# Patient Record
Sex: MALE | Race: WHITE
[De-identification: names, ages, dates, MRNs, and addresses within clinical notes are randomized per-mention and may not be internally consistent; named-entity substitution may affect disease eponyms.]

---

## 2019-01-07 NOTE — RAD
EXAM DESCRIPTION: Femur,Right (accession [id number]),

Hip,Right 2 Views (accession [id number])



CLINICAL HISTORY: 68 years Male, fell



COMPARISON: None.



TECHNIQUE: 4 views of the right hip. 3 views of the mid and

distal right femur.



IMPRESSION:

Partially visualized total left hip arthroplasty. Total right hip

arthroplasty is present without evidence of hardware complication

such as fracture or perivertebral hardware loosening. Surgical

hardware appears in satisfactory alignment.



No acute displaced fracture. No dislocation.



There is no soft tissue swelling or joint effusion. 



There is mild arthrosis of the pubic joint.



 There is no acute fracture or aggressive appearing osseous

lesion of the right femur.



Electronically signed by:  Sp Power MD  1/7/2019 12:04 PM [facility]

Workstation: [id number]

## 2019-01-07 NOTE — RAD
EXAM DESCRIPTION: Femur,Right (accession [id number]),

Hip,Right 2 Views (accession [id number])



CLINICAL HISTORY: 68 years Male, fell



COMPARISON: None.



TECHNIQUE: 4 views of the right hip. 3 views of the mid and

distal right femur.



IMPRESSION:

Partially visualized total left hip arthroplasty. Total right hip

arthroplasty is present without evidence of hardware complication

such as fracture or perivertebral hardware loosening. Surgical

hardware appears in satisfactory alignment.



No acute displaced fracture. No dislocation.



There is no soft tissue swelling or joint effusion. 



There is mild arthrosis of the pubic joint.



 There is no acute fracture or aggressive appearing osseous

lesion of the right femur.



Electronically signed by:  Sp Power MD  1/7/2019 12:04 PM [facility]

Workstation: [id number]

## 2019-01-07 NOTE — ED.PDOC
History of Present Illness





- General


Chief Complaint: Trauma


Stated Complaint: RIGHT SIDE PAIN ALL THE WAY DOWN


Time Seen by Provider: 01/07/19 11:03


Source: patient


Exam Limitations: no limitations





- History of Present Illness


Initial Comments: 





the patient is a 68-year-old  male presenting to the emergency room 

after having had a dresser fall over on him at the nursing home earlier today.  

He has some right hip pain that is mainly concerned about is he has had a repair

there before.  He normally gets around in wheelchair and he is still able to 

transfer while bearing weight on the right hip.  He has numerous other sore 

places but none that are bothering him as much as this.  This occurred this 

morning.


Timing/Duration: momentarily


Severity: moderate


Improving Factors: immobilization


Worsening Factors: movement


Associated Symptoms: denies symptoms





Review of Systems





- Review of Systems


Review of Systems: 





01/07/19 12:14


numerous long term complaints but review of system is for new complaints


Constitutional: States: no symptoms reported


EENTM: States: no symptoms reported


Respiratory: States: no symptoms reported


Cardiology: States: no symptoms reported


Gastrointestinal/Abdominal: States: no symptoms reported


Genitourinary: States: no symptoms reported


Musculoskeletal: States: see HPI


Skin: States: no symptoms reported


Neurological: States: no symptoms reported


Endocrine: States: no symptoms reported


All other Systems: No Change from Baseline





Past Medical History (General)





- Patient Medical History


Hx Hypertension: Yes


Hx Cancer: No


Hx Hepatitis C: No





- Vaccination History


Hx Tetanus, Diphtheria Vaccination: Yes


Hx Influenza Vaccination: Yes


Hx Pneumococcal Vaccination: Yes


Immunizations Up to Date: Yes





- Social History


Hx Tobacco Use: No


Hx Alcohol Use: No


Hx Substance Use: No


Hx Substance Use Treatment: No


Hx Depression: No





- Activities of Daily Living


Nursing Home/Assisted Living (if applicable):: [facility]





Family Medical History





- Family History


  ** Mother


Family History: Unknown





Physical Exam





- Physical Exam


General Appearance: Alert, Comfortable, No apparent distress


Eye Exam: bilateral normal


Ears, Nose, Throat: hearing grossly normal, normal pharynx


Neck: full range of motion, supple


Respiratory: lungs clear, normal breath sounds, no respiratory distress, no 

accessory muscle use


Cardiovascular/Chest: normal peripheral pulses, no edema, other - regular rate


Peripheral Pulses: radial,right: 2+, radial,left: 2+


Gastrointestinal/Abdominal: non tender, soft


Rectal Exam: other - pelvis is stable


Back Exam: no CVA tenderness, no vertebral tenderness


Extremity: normal range of motion - he does have some discomfort to palpation 

over the right hip but no gross deformity.  Previous operative site is noted., 

no calf tenderness, normal capillary refill, other - active and passive range of

motion appear to be preserved in the right hip when compared to baseline.


Neurologic: CNs II-XII nml as tested, alert, normal mood/affect, oriented x 3, 

other - chronic neurological changes are present related to chronic illness


Skin Exam: normal color


Comments: 





                               Vital Signs - 8 hr











  01/07/19





  11:13


 


Temperature 97.4 F L


 


Pulse Rate [ 78





MONITOR] 


 


Respiratory 20





Rate 


 


Blood Pressure 160/98





[Right Arm] 


 


O2 Sat by Pulse 96





Oximetry 














Progress





- Progress


Progress: 





01/07/19 12:16


the patient 68-year-old  male presenting to emergency room secondary to

right hip pain after having fallen backwards with a dresser on top of him.  X-

ray of the pelvis and right hip shows no evidence of any fracture or 

dislocation.  The patient will be sore for the next few weeks but can use his 

hydrocodone as needed.  Needs to ambulate carefully to prevent any further 

falls.  Keep routine follow-up with primary care doctor.  ER warnings were 

given.





Departure





- Departure


Clinical Impression: 


Fall at nursing home


Qualifiers:


 Encounter type: initial encounter Qualified Code(s): W19.XXXA - Unspecified 

fall, initial encounter; Y92.129 - Unspecified place in nursing home as the 

place of occurrence of the external cause





Strain of left hip


Qualifiers:


 Encounter type: initial encounter Qualified Code(s): S76.012A - Strain of 

muscle, fascia and tendon of left hip, initial encounter





Disposition: Discharge to SNF


Condition: Fair


Departure Forms:  ED Discharge - Pt. Copy, Patient Portal Self Enrollment


Diet: diabetic diet


Activity: increase activity as tolerated


Additional Instructions: 


the patient 68-year-old  male presenting to emergency room secondary to

right hip pain after having fallen backwards with a dresser on top of him.  X-

ray of the pelvis and right hip shows no evidence of any fracture or 

dislocation.  The patient will be sore for the next few weeks but can use his 

hydrocodone as needed.  Needs to ambulate carefully to prevent any further 

falls.  Keep routine follow-up with primary care doctor.  ER warnings were 

given.

## 2019-06-18 NOTE — CT
EXAM DESCRIPTION: Cervical Spine



CLINICAL HISTORY: NECK PAIN, FALL



COMPARISON: Previous CT cervical spine April 4, 2016



TECHNIQUE: Cervical CT is performed with thin-section axial

imaging. MPRs are created and reviewed as well.



FINDINGS:



Axial bone window images reveal intact ring of C1. No abnormal

widening of the atlantodens interval. No fracture of the

vertebral bodies or transverse processes or posterior elements.

Lung apices appear clear. No cervical mass or adenopathy.



Sagittal reformatted images show normal alignment of vertebral

bodies and facets. No jumped facet or facet fracture. Normal

craniocervical alignment. No prevertebral soft tissue swelling.

No a avulsion of the spinous processes.



Spondylosis: Mild facet degenerative changes with mild

disc/osteophyte complexes. No acute appearing disc herniation or

significant spinal stenosis.



Coronal reformatted images show normal atlantooccipital and

atlantoaxial alignment. The base of the dens is intact as is the

body of C2. Intact lateral masses.



IMPRESSION:



Negative for fracture or posttraumatic subluxation.







This exam was performed according to our departmental

dose-optimization program, which includes automated exposure

control, adjustment of the mA and/or kV according to patient size

and/or use of iterative reconstruction technique. Total DLP

equals 430.29 mGycm.



Electronically signed by:  Lucas Rodas MD  6/18/2019 3:50

PM CDT Workstation: [id number]

## 2019-06-18 NOTE — CT
EXAM DESCRIPTION: Head CT without contrast



CLINICAL HISTORY: HEAD INJURY, FALL



COMPARISON: Previous CT had August 20, 2018



TECHNIQUE: Noncontrast head CT was performed with routine

protocol.



FINDINGS:



Normal gray-white matter differentiation. Ventricles and sulci

are prominent consistent with age advanced cerebral volume loss.

Low density white matter consistent with chronic microvascular

ischemic change.



No high density hemorrhage, focal edema or shift of the midline.

No sulcal effacement.



Normal orbital contents. Basilar cisterns appear clear.



Intact calvarium with no fracture or lytic lesion. Normal

aeration of tympanic cavities and mastoid air cells. No fluid

levels in the paranasal sinuses. Skull base appears intact.

Symmetrical internal auditory canals. Coronal and sagittal

reformatted images confirm the findings. No change compared to

the previous study August 20, 2018.



IMPRESSION:



No acute intracranial pathologic process.





This exam was performed according to our departmental

dose-optimization program, which includes automated exposure

control, adjustment of the mA and/or kV according to patient size

and/or use of iterative reconstruction technique. Total DLP

equals 864.76 mGycm.



Electronically signed by:  Lucas Rodas MD  6/18/2019 3:46

PM CDT Workstation: [id number]

## 2019-06-18 NOTE — ED.PDOC
History of Present Illness





- General


Chief Complaint: Trauma


Stated Complaint: s/p fall,right shoulder pain


Time Seen by Provider: 06/18/19 14:01


Source: patient, nursing home records


Exam Limitations: clinical condition





- History of Present Illness


Initial Comments: 





PT PRESENTS TO THE ED AFTER FALLING FROM SEATED POSITION WHILE AT NURSING HOME 

WHERE HE RESIDES.  PT REPORTS HITTING HEAD ON A TILE SURFACE BUT DENIES 

HEADACHE.  PTS MAIN COMPLAINT IS RIGHT SHOULDER PAIN, BUT HE ALSO COMPLAINS OF 

NECK PAIN, MID THORACIC BACK PAIN, AND PAIN IN THE RIGHT CHEST WALL.  PT DENIES 

LOC.  PT HAS A HISTORY OF PARKINSONS DISEASE THEREFORE HPI AND ROS IS LIMITED 

DUE TO PTS CHRONIC CONDITION. 


Occurred: just prior to arrival


Severity: moderate


Pain Location: neck, chest, back, upper extremity


Method of Injury: fall


Improving Factors: immobilization


Worsening Factors: movement


Loss of Consciousness: no loss of consciousness


Associated Symptoms (Fall): chest pain, neck pain


Allergies/Adverse Reactions: 


Allergies





Amoxicillin Allergy (Verified 06/18/19 14:18)


   


Codeine Allergy (Verified 06/18/19 14:18)


   








Review of Systems





- Review of Systems


Constitutional: States: see HPI


EENTM: States: see HPI.  Denies: throat swelling


Respiratory: States: see HPI


Cardiology: States: see HPI


Gastrointestinal/Abdominal: States: see HPI


Genitourinary: States: see HPI


Musculoskeletal: States: see HPI


Skin: States: see HPI


Neurological: States: see HPI


Endocrine: States: see HPI





Past Medical History (General)





- Patient Medical History


Hx Cardiac Disorders: Yes - Angina


Hx Hypertension: Yes


Hx Gastroesophageal Reflux: Yes


Hx Cancer: No


Hx Hepatitis C: No


Hx Other - free text: 





PARKINSONS





- Vaccination History


Hx Tetanus, Diphtheria Vaccination: Yes


Hx Influenza Vaccination:  - UNKNOWN


Hx Pneumococcal Vaccination:  - UNKNOWN





- Social History


Hx Tobacco Use: No


Hx Alcohol Use: No


Hx Substance Use: No


Hx Substance Use Treatment: No


Hx Depression: No





- Activities of Daily Living


Nursing Home/Assisted Living (if applicable):: [facility]





Family Medical History





- Family History


  ** Mother


Family History: Unknown





Physical Exam





- Physical Exam


General Appearance: Lethargic, Well Groomed, Well Hydrated, Well Nourished


Head Injury: no evidence of injury


Eye Exam: bilateral normal


ENT Exam: hearing grossly normal, no evidence of ENT injury


Neck Exam: full range of motion, normal alignment, normal inspection, tender 

midline


Cardiovascular/Respiratory: no M/R/G, normal breath sounds, no respiratory 

distress, other - RIGHT LATERAL CHEST WALL TENDERNESS


Gastrointestinal/Abdominal: non tender, soft


Back Exam: vertebral tenderness - THORACIC


Extremity Exam: no evidence of injury, normal range of motion, tenderness - TO 

RIGHT SHOULDER


Neurologic: depressed affect


Skin Exam: normal color, warm/dry





- [place] Coma Score


Best Eye Response (Andressa): (4) open spontaneously


Best Verbal Response ([place]): (5) oriented


Best Motor Response (Andressa): (6) obeys commands





Progress





- Progress


Progress: 





06/18/19 16:17


PT RESTING COMFORTABLY, REPORTS IMPROVEMENT IN PAIN AFTER TRAMADOL. CT FINDINGS 

DISCUSSED.  WILL D/C BACK TO NURSING HOME.





Departure





- Departure


Clinical Impression: 


 Strain of neck muscle, Contusion of rib, Contusion of right shoulder, Contusion

of right hip, Fall at nursing home





Time of Disposition: 16:18


Disposition: Discharge to Home or Self Care


Condition: Good


Departure Forms:  ED Discharge - Pt. Copy, Patient Portal Self Enrollment


Instructions:  DI for Trauma


Referrals: 


UNKNOWN,PHYSICIAN [Primary Care Provider] - 1-5 Days

## 2019-06-18 NOTE — CT
EXAM DESCRIPTION: Chest w/o Contrast (accession [id number]),

Abdoment/Pelvis w/o Contrast (accession [id number])



CLINICAL HISTORY: 69 years Male, FALL



COMPARISON: None.



TECHNIQUE: CT images through the chest, abdomen, and pelvis

without IV contrast. Multiplanar reformations were provided. 

This exam was performed according to our departmental

dose-optimization program, which includes automated exposure

control, adjustment of the mA and/or kV according to patient size

and/or use of iterative reconstruction technique.



CT CHEST FINDINGS:

Heart and mediastinum: Heart is normal size. Esophagus is

unremarkable. No mediastinal lymphadenopathy. Evaluation for

hilar lymphadenopathy limited without contrast. No sniffing

atherosclerosis.



Thyroid Gland: Normal appearance of the thyroid gland.



Lungs: Clear.



Airways: No filling defects or bronchiectasis.



Pleura: No effusion or pneumothorax.



Musculoskeletal and Soft Tissues: Degenerative changes with no

acute fracture or aggressive appearing osseous lesion. Soft

tissues are unremarkable with no axillary lymphadenopathy.



CT ABDOMEN FINDINGS:





There is limited evaluation of the solid organs secondary to the

lack of intravenous contrast.



Liver: Normal.



Gallbladder and biliary: Normal.



Pancreas: Normal.



Spleen: Normal.



Kidneys and adrenal glands: Cortical left renal cyst measures 1.8

cm. No hydronephroureter. Adrenal glands are normal.



GI tract: The stomach and small bowel are normal.



Peritoneal cavity: No ascites or free air.



Retroperitoneum and lymph nodes: Normal.



Vascular: Mild atherosclerosis.



Musculoskeletal and soft tissues: No acute fracture or aggressive

appearing osseous lesion. Bilateral hip arthroplasty which

obscures the lower pelvis due to streak hardware artifact. Soft

tissues are unremarkable.



CT PELVIS FINDINGS:

GI tract: Moderate colonic stool throughout the colon. Appendix

is normal.



Urinary bladder: Moderately distended.



Prostate: Obscured by streak hardware artifact from bilateral hip

arthroplasty.



IMPRESSION:

1. No acute abnormality of the chest, abdomen or pelvis on this

noncontrast CT.

2. Moderate plaque stool.

3. Cortical left renal cyst.



Electronically signed by:  Sp Power MD  6/18/2019 3:59 PM CDT

Workstation: [id number]

## 2019-06-18 NOTE — CT
EXAM DESCRIPTION: Chest w/o Contrast (accession [id number]),

Abdoment/Pelvis w/o Contrast (accession [id number])



CLINICAL HISTORY: 69 years Male, FALL



COMPARISON: None.



TECHNIQUE: CT images through the chest, abdomen, and pelvis

without IV contrast. Multiplanar reformations were provided. 

This exam was performed according to our departmental

dose-optimization program, which includes automated exposure

control, adjustment of the mA and/or kV according to patient size

and/or use of iterative reconstruction technique.



CT CHEST FINDINGS:

Heart and mediastinum: Heart is normal size. Esophagus is

unremarkable. No mediastinal lymphadenopathy. Evaluation for

hilar lymphadenopathy limited without contrast. No sniffing

atherosclerosis.



Thyroid Gland: Normal appearance of the thyroid gland.



Lungs: Clear.



Airways: No filling defects or bronchiectasis.



Pleura: No effusion or pneumothorax.



Musculoskeletal and Soft Tissues: Degenerative changes with no

acute fracture or aggressive appearing osseous lesion. Soft

tissues are unremarkable with no axillary lymphadenopathy.



CT ABDOMEN FINDINGS:





There is limited evaluation of the solid organs secondary to the

lack of intravenous contrast.



Liver: Normal.



Gallbladder and biliary: Normal.



Pancreas: Normal.



Spleen: Normal.



Kidneys and adrenal glands: Cortical left renal cyst measures 1.8

cm. No hydronephroureter. Adrenal glands are normal.



GI tract: The stomach and small bowel are normal.



Peritoneal cavity: No ascites or free air.



Retroperitoneum and lymph nodes: Normal.



Vascular: Mild atherosclerosis.



Musculoskeletal and soft tissues: No acute fracture or aggressive

appearing osseous lesion. Bilateral hip arthroplasty which

obscures the lower pelvis due to streak hardware artifact. Soft

tissues are unremarkable.



CT PELVIS FINDINGS:

GI tract: Moderate colonic stool throughout the colon. Appendix

is normal.



Urinary bladder: Moderately distended.



Prostate: Obscured by streak hardware artifact from bilateral hip

arthroplasty.



IMPRESSION:

1. No acute abnormality of the chest, abdomen or pelvis on this

noncontrast CT.

2. Moderate plaque stool.

3. Cortical left renal cyst.



Electronically signed by:  Sp Power MD  6/18/2019 3:59 PM CDT

Workstation: [id number]

## 2019-06-18 NOTE — CT
EXAM DESCRIPTION: Upper Extremity



CLINICAL HISTORY: 69 years Male, FALL-RT SHOULDER PAIN



COMPARISON: None.



TECHNIQUE: Multiple axial images of the right shoulder without

contrast.  Multiplanar reformations were provided. This exam was

performed according to our departmental dose-optimization

program, which includes automated exposure control, adjustment of

the mA and/or kV according to patient size and/or use of

iterative reconstruction technique.



FINDINGS:

Bones and joint spaces: No acute displaced fracture. No

glenohumeral dislocation. Moderate arthrosis of the

acromioclavicular joint with 4 mm undersurface spurring. Mild

arthrosis of the glenohumeral joint with small inferiorly

projecting osteophyte extending off the medial inferior aspect of

the humeral head. Subchondral cystic change noted over the

greater tuberosity which can be from rotator cuff injury. No

significant joint effusion.



Muscles and tendons: Muscles and tendons appear intact.



Soft tissues: Soft tissues of the right shoulder are

unremarkable.



IMPRESSION:

1. No acute CT abnormality of the right shoulder.

2. Moderate arthrosis of the acromioclavicular joint with mild

undersurface spurring.

3. Mild glenohumeral arthrosis.

4. Subchondral cystic change of the greater tuberosity can be

seen with rotator cuff injury. MRI of the right shoulder may be

obtained to further characterize and evaluate the rotator cuff as

indicated, in addition to the labrum, tendinous, ligamentous.



Electronically signed by:  Sp Power MD  6/18/2019 4:04 PM CDT

Workstation: [id number]

## 2020-04-21 ENCOUNTER — HOSPITAL ENCOUNTER (EMERGENCY)
Dept: HOSPITAL 39 - ER | Age: 70
Discharge: NURSING, REHAB OR LONG-TERM CARE | End: 2020-04-21
Payer: MEDICARE

## 2020-04-21 VITALS — DIASTOLIC BLOOD PRESSURE: 90 MMHG | TEMPERATURE: 98.1 F | SYSTOLIC BLOOD PRESSURE: 157 MMHG

## 2020-04-21 VITALS — OXYGEN SATURATION: 95 %

## 2020-04-21 DIAGNOSIS — R51: ICD-10-CM

## 2020-04-21 DIAGNOSIS — E11.9: ICD-10-CM
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
## 2020-04-21 DIAGNOSIS — I10: ICD-10-CM

## 2020-04-21 DIAGNOSIS — S01.311A: Primary | ICD-10-CM

## 2020-04-21 DIAGNOSIS — S00.431A: ICD-10-CM

## 2020-04-21 DIAGNOSIS — S93.431A: ICD-10-CM
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
## 2020-04-21 DIAGNOSIS — Z79.899: ICD-10-CM

## 2020-04-21 DIAGNOSIS — F17.200: ICD-10-CM

## 2020-04-21 PROCEDURE — 72125 CT NECK SPINE W/O DYE: CPT

## 2020-04-21 PROCEDURE — 73630 X-RAY EXAM OF FOOT: CPT

## 2020-04-21 PROCEDURE — 90471 IMMUNIZATION ADMIN: CPT

## 2020-04-21 PROCEDURE — 70450 CT HEAD/BRAIN W/O DYE: CPT

## 2020-04-21 PROCEDURE — 73610 X-RAY EXAM OF ANKLE: CPT

## 2020-04-21 PROCEDURE — 73700 CT LOWER EXTREMITY W/O DYE: CPT

## 2020-04-21 PROCEDURE — 90715 TDAP VACCINE 7 YRS/> IM: CPT

## 2020-04-21 NOTE — ED.PDOC
History of Present Illness





- General


Chief Complaint: Trauma


Stated Complaint: fall


Time Seen by Provider: 20 07:27


Source: patient, RN notes reviewed, Vital Signs reviewed, nursing home records


Exam Limitations: no limitations





- History of Present Illness


Initial Comments: 





Patient is a 70-year-old white male who is in a nursing home and fell from bed 

this morning striking his head on a trash can.  There was no loss of 

consciousness.  Patient complains of right ear pain, mild headache and neck 

pain.  The pain is throbbing in nature.  Worsened with palpation or movement.  

Better with rest.  It is nonradiating.  It is moderate in intensity.  Patient's 

tetanus shot is not up-to-date.


Occurred: just prior to arrival, this morning


Severity: moderate


Pain Location: head, neck, lower extremity


Method of Injury: fall


Improving Factors: immobilization


Worsening Factors: movement


Loss of Consciousness: no loss of consciousness


Associated Symptoms (Fall): headache, neck pain


Allergies/Adverse Reactions: 


Allergies





Amoxicillin Allergy (Verified 19 14:18)


   


Codeine Allergy (Verified 19 14:18)


   





Home Medications: 


Ambulatory Orders





Albuterol Sulfate Nebs [Proventil Nebs] 2.5 mg NEB Q6HR PRN 20 


Albuterol Sulfate [Ventolin Hfa] 108 mcg INH Q6HR PRN 20 


Ascorbic Acid [Vitamin C] 1,000 mg PO DAILY 20 


Atorvastatin Calcium [Lipitor] 10 mg PO BEDTIME 20 


Carbidopa-Levodopa [Carbidopa/Levodopa  mg] 2 tab PO 5XD 20 


Cyanocobalamin [Vitamin B12] 1,000 mcg PO DAILY 20 


Escitalopram Oxalate 15 mg PO BEDTIME 20 


Fluticasone Propionate (Nasal) [Fluticasone Propionate] 50 mcg EMIL BID 20 


Gabapentin 600 mg PO TID 20 


Guaifenesin 400 mg PO BID 20 


Hydroxyzine HCl 25 mg PO TID 20 


Levothyroxine Sodium [Synthroid] 100 mcg PO 0600 20 


Lisinopril 20 mg PO DAILY 20 


Meloxicam 15 mg PO DAILY 20 


Menthol (Topical Analgesic) [Arctic Relief Roll-On Mingo] 1 dose TOP BID 20 


Multiple Vitamin 1 tab PO DAILY 20 


Polyethylene Glycol 3350 1 pow PO DAILY 20 


Tramadol HCl 50 mg PO Q6H PRN 20 


Trazodone HCl [Trazodone Hydrochloride] 25 mg PO BEDTIME 20 


Acetaminophen [Tylenol] 650 mg PO PRN 20 


Albuterol Inhaler [Ventolin Hfa Inhaler] 2 inh IN PRN 20 


Ciclopirox 1 % EX PRN 20 


Ibuprofen [Ibu] 800 mg PO PRN 20 


Lidocaine 5% Patch [Lidoderm Patch] 1 ea TOP DAILY 20 


Magnesium Hydroxide [Milk Of Magnesia] 30 ml PO PRN 20 


Polyvinyl Alcohol [Artificial Tears] 1 drop BOTH_EYES PRN 20 


Triamcinolone 0.1% Oint [Kenalog 0.1% Ointment] 1 applic TOP PRN 20 











Review of Systems





- Review of Systems


Constitutional: States: no symptoms reported, see HPI.  Denies: chills, fever, 

weakness


EENTM: States: no symptoms reported.  Denies: blurred vision, double vision, 

nose congestion


Respiratory: States: no symptoms reported.  Denies: cough, short of breath, 

wheezing


Cardiology: States: no symptoms reported.  Denies: chest pain, palpitations, 

syncope


Gastrointestinal/Abdominal: States: no symptoms reported.  Denies: abdominal 

pain, diarrhea, nausea, vomiting


Genitourinary: States: no symptoms reported


Musculoskeletal: States: see HPI, joint pain - Right ankle and foot, neck pain


Skin: States: see HPI, other - Laceration right ear


Neurological: States: no symptoms reported


Endocrine: States: no symptoms reported


Hematologic/Lymphatic: States: no symptoms reported


All other Systems: No Change from Baseline





Past Medical History (General)





- Patient Medical History


Hx Seizures: No


Hx Stroke: No


Hx Asthma: No


Hx of COPD: No


Hx Cardiac Disorders: Yes - Angina


Hx Congestive Heart Failure: No


Hx Pacemaker: No


Hx Hypertension: Yes


Hx Thyroid Disease: Yes


Hx Diabetes: Yes


Hx Gastroesophageal Reflux: Yes


Hx Cancer: No


Hx Hepatitis C: No


Hx MRSA: No





- Vaccination History


Hx Tetanus, Diphtheria Vaccination:  - unknown


Hx Influenza Vaccination: Yes


Hx Pneumococcal Vaccination:  - unknown





- Social History


Hx Tobacco Use: Yes


Hx Alcohol Use: Yes


Hx Substance Use: No


Hx Substance Use Treatment: No


Hx Depression: Yes


Hx Physical Abuse: No


Hx Emotional Abuse: No





- Activities of Daily Living


Nursing Home/Assisted Living (if applicable):: [facility]





Family Medical History





- Family History


  ** Mother


Family History: Unknown


Living Status: 





  ** Father


Living Status: 





Physical Exam





- Physical Exam


General Appearance: Alert, Anxious, Obese, Well Developed, Well Hydrated, Well 

Nourished


Head Injury: other - Laceration right ear.


Eye Exam: bilateral normal


ENT Exam: hearing grossly normal, no evidence of ENT injury, no dental injury


Neck Exam: full range of motion, normal alignment, tender lateral


Cardiovascular/Respiratory: regular rate, rhythm, no M/R/G, normal peripheral 

pulses, no JVD, normal breath sounds, no respiratory distress


Gastrointestinal/Abdominal: normal bowel sounds, non tender, soft, no 

organomegaly


Back Exam: normal inspection, no CVA tenderness, no vertebral tenderness


Extremity Exam: pelvis stable, joint effusion - Right ankle, tenderness - Right 

ankle and foot


Neurologic: CNs II-XII nml as tested, no motor/sensory deficits, alert, normal 

mood/affect, oriented x 3


Skin Exam: normal color, warm/dry





Progress





- Progress


Progress: 


Differential diagnosis: Ankle fracture, skull fracture, ear laceration, neck 

fracture among others.








20 10:34


CT scans and x-rays are unremarkable.  No acute fractures.  Patient received 

tetanus prophylaxis.  Your laceration was repaired.  Patient started on 

antibiotics.  Plan on discharge home to the nursing home at this time.  I 

discussed this plan of care with the patient he voices understanding and 

agreement.





Abdiel Koch M.D.


#[phone]











- Results/Orders


Results/Orders: 





Study: 6 Views of the Right Foot and Right Ankle.  





Indication: fall from bed with pain  Comparison: None.  Impression:  The 

posterior third of the calcaneus appears slightly shortened with patchy 

sclerosis at this site. This is concerning for age-indeterminate coronally 

oriented impaction fracture. Further characterization with CT of the ankle and 

CT of the foot recommended.  Ankle mortise alignment normal. Mild spurring infer

ior margin medial meniscus and lateral malleolus. Calcaneal spurring at the 

Achilles tendon insertion and plantar fascia origin.  No acute fracture of the 

forefoot or midfoot identified.  Mild soft tissue swelling of the ankle and 

hindfoot.  Electronically signed by: Hakeem Saravia MD 2020 8:06 AM CDT








Study: CT of the Head.  





Indication: fall from bed with c/o pain  Technique: Axial CT images of the head 

were acquired without intravenous contrast. This exam was performed according to

our departmental dose-optimization program, which includes automated exposure 

control, adjustment of the mA and/or kV according to patient size and/or use of 

iterative reconstruction technique.  Comparison: None.  Findings:  No acute 

ischemia, acute hemorrhage, mass, mass effect, midline shift, or extra-axial 

fluid collection identified by CT. Ventricles are normal in configuration 

without hydrocephalus. Patchy hypoattenuation of the periventricular and 

subcortical white matter noted. This is nonspecific but most consistent with 

chronic microvascular ischemic change. Global parenchymal volume loss and 

intracranial atherosclerosis noted as well. Paranasal sinuses are adequately 

aerated. Mastoid air cells are adequately aerated. Osseous structures and soft 

tissues are unremarkable.  Impression:  No acute intracranial abnormality by CT.

 Senescent changes.    Electronically signed by: Hakeem Saravia MD 2020 7:59 

AM CDT








Study: CT cervical spine.  





Indication: fall from bed with c/o pain.  Technique: Axial CT images were 

acquired through the cervical spine without intravenous contrast. Coronal and 

sagittal reformats performed. This exam was performed according to our 

departmental dose-optimization program, which includes automated exposure 

control, adjustment of the mA and/or kV according to patient size and/or use of 

iterative reconstruction technique.  Comparison: None  Findings:  Vertebral body

height maintained. Straightening cervical spine. No acute fracture or 

subluxation. Multilevel cervical disc disease noted and most pronounced at C5-C6

where there is mild to moderate disc space height loss and tiny disc osteophyte 

complexes producing mild spinal canal narrowing and mild bilateral neural 

foraminal narrowing. Atherosclerosis carotid arteries.  Impression:  No acute 

cervical fracture or subluxation identified.  Additional findings as above.  

Electronically signed by: Hakeem Saravia MD 2020 8:00 AM CDT








EXAM DESCRIPTION: Lower Extremity  





CLINICAL HISTORY: 70 years Male, right foot and ankle pain  COMPARISON: None.  

TECHNIQUE: CT of the right foot and ankle was performed without intravenous 

contrast administration. Sagittal and coronal reconstructed reviewed.  This exam

was performed according to our departmental dose-optimization program, which 

includes automated exposure control, adjustment of the mA and/or kV according to

patient size and/or use of iterative reconstruction technique.  FINDINGS: 

Moderate degenerative changes are identified in the tibiotalar and subtalar 

joints. Sequelae of old trauma in the posterior aspect of the calcaneus. Plantar

and posterior calcaneal spurs are noted. Old avulsion fracture of the lateral 

malleolus is also noted.  No acute fracture or dislocation.  CT is limited for 

evaluation of soft tissue structures, however no acute abnormality is noted. 

Calcification is identified in the region of the peroneal tendons which could 

represent sequelae of prior trauma as well. Heterotopic ossification is 

identified within the tarsal tunnel with possible mass effect on the vasculature

and nerves.  1.6 x 1.8 cm cystic lesion is noted medial to the anterior body of 

the calcaneus probably representing a ganglion cyst.  IMPRESSION:  1. Sequelae 

of old trauma are noted in the calcaneus and lateral malleolar regions. 2. 

Degenerative changes are identified in the tibiotalar and subtalar joints. 3. 

Calcification is identified in the region of the peroneal tendons which could 

represent sequelae of prior trauma as well. Heterotopic ossification is 

identified within the tarsal tunnel with possible mass effect on the 

neurovascular bundle. 4. 1.6 x 1.8 cm cystic lesion is noted medial to the 

anterior body of the calcaneus probably representing a ganglion cyst.  

Electronically signed by: Beverly Weston MD 2020 9:57 AM














Procedures





- Laceration/Wound Repair


  ** Right Ear


Wound's Depth, Shape: irregular, stellate


Wound Explored: clean


Irrigated w/ Saline (cc's): 100


Betadine Prep?: Yes


Anesthesia: 1% Lidocaine


Volume Anesthetic (cc's): 10 - Digital block performed on the right ear.


Wound Debrided: No wound debridement


Wound Repaired With: sutures - Prolene


Suture Size/Type: 5:0, prolene


Number of Sutures: 7


Layer Closure?: No


Sterile Dressing Applied?: Yes


Splint Applied?: No


Sling Applied?: No


Progress: 





After digital block was performed on the right ear with 10 mL's of lidocaine 

without epi, good anesthesia was obtained and suture repair was performed.  The 

ear was tacked back in place.  There were 7, 5-0 sutures applied.  The suture 

material was Prolene.  Patient tolerated the procedure well.  Estimated blood 

loss less than 5 mL's.  No complications.  Hemostasis was achieved.  Wound edges

were well approximated.





Departure





- Departure


Clinical Impression: 


Fall


Qualifiers:


 Encounter type: initial encounter Qualified Code(s): W19.XXXA - Unspecified 

fall, initial encounter





Laceration of ear


Qualifiers:


 Encounter type: initial encounter Laterality: right Qualified Code(s): S01.311A

- Laceration without foreign body of right ear, initial encounter





Contusion of head


Qualifiers:


 Encounter type: initial encounter Contusion of head detail: ear Laterality: 

right Qualified Code(s): S00.431A - Contusion of right ear, initial encounter





High ankle sprain of right lower extremity


Qualifiers:


 Encounter type: initial encounter Qualified Code(s): S93.431A - Sprain of 

tibiofibular ligament of right ankle, initial encounter





Fall at nursing home


Qualifiers:


 Encounter type: initial encounter Qualified Code(s): W19.XXXA - Unspecified 

fall, initial encounter; Y92.129 - Unspecified place in nursing home as the 

place of occurrence of the external cause





Time of Disposition: 10:39


Disposition: Discharge to Rehab Facility


Condition: Good


Departure Forms:  ED Discharge - Pt. Copy, Patient Portal Self Enrollment


Instructions:  DI for Trauma, Laceration Repair With Stitches (DC), Preventing 

Falls in the Older Adult, Minor Head Injury (DC), Ankle Sprain (DC)


Activity: as per physical therapy


Referrals: 


Derick Mason MD [Primary Care Provider] - 1-5 Days


Home Medications: 


Ambulatory Orders





Albuterol Sulfate Nebs [Proventil Nebs] 2.5 mg NEB Q6HR PRN 20 


Albuterol Sulfate [Ventolin Hfa] 108 mcg INH Q6HR PRN 20 


Ascorbic Acid [Vitamin C] 1,000 mg PO DAILY 20 


Atorvastatin Calcium [Lipitor] 10 mg PO BEDTIME 20 


Carbidopa-Levodopa [Carbidopa/Levodopa  mg] 2 tab PO 5XD 20 


Cyanocobalamin [Vitamin B12] 1,000 mcg PO DAILY 20 


Escitalopram Oxalate 15 mg PO BEDTIME 20 


Fluticasone Propionate (Nasal) [Fluticasone Propionate] 50 mcg EMIL BID 20 


Gabapentin 600 mg PO TID 20 


Guaifenesin 400 mg PO BID 20 


Hydroxyzine HCl 25 mg PO TID 20 


Levothyroxine Sodium [Synthroid] 100 mcg PO 0600 20 


Lisinopril 20 mg PO DAILY 20 


Meloxicam 15 mg PO DAILY 20 


Menthol (Topical Analgesic) [Arctic Relief Roll-On Mingo] 1 dose TOP BID 20 


Multiple Vitamin 1 tab PO DAILY 20 


Polyethylene Glycol 3350 1 pow PO DAILY 20 


Tramadol HCl 50 mg PO Q6H PRN 20 


Trazodone HCl [Trazodone Hydrochloride] 25 mg PO BEDTIME 20 


Acetaminophen [Tylenol] 650 mg PO PRN 20 


Albuterol Inhaler [Ventolin Hfa Inhaler] 2 inh IN PRN 20 


Ciclopirox 1 % EX PRN 20 


Ibuprofen [Ibu] 800 mg PO PRN 20 


Lidocaine 5% Patch [Lidoderm Patch] 1 ea TOP DAILY 20 


Magnesium Hydroxide [Milk Of Magnesia] 30 ml PO PRN 20 


Polyvinyl Alcohol [Artificial Tears] 1 drop BOTH_EYES PRN 20 


Triamcinolone 0.1% Oint [Kenalog 0.1% Ointment] 1 applic TOP PRN 20 








Additional Instructions: 


Patient to have sutures removed in 10 days.  Nursing home may perform this.

## 2020-04-21 NOTE — CT
Study: CT of the Head. 



Indication: fall from bed with c/o pain



Technique: Axial CT images of the head were acquired without

intravenous contrast. This exam was performed according to our

departmental dose-optimization program, which includes automated

exposure control, adjustment of the mA and/or kV according to

patient size and/or use of iterative reconstruction technique.



Comparison: None.



Findings:



No acute ischemia, acute hemorrhage, mass, mass effect, midline

shift, or extra-axial fluid collection identified by CT. 

Ventricles are normal in configuration without hydrocephalus. 

Patchy hypoattenuation of the periventricular and subcortical

white matter noted. This is nonspecific but most consistent with

chronic microvascular ischemic change. Global parenchymal volume

loss and intracranial atherosclerosis noted as well. 

Paranasal sinuses are adequately aerated. 

Mastoid air cells are adequately aerated. 

Osseous structures and soft tissues are unremarkable. 



Impression: 



No acute intracranial abnormality by CT. 



Senescent changes. 







Electronically signed by:  Hakeem Saravia MD  4/21/2020 7:59 AM CDT

Workstation: [id number]

## 2020-04-21 NOTE — CT
Study: CT cervical spine. 



Indication: fall from bed with c/o pain.



Technique: Axial CT images were acquired through the cervical

spine without intravenous contrast. Coronal and sagittal

reformats performed. This exam was performed according to our

departmental dose-optimization program, which includes automated

exposure control, adjustment of the mA and/or kV according to

patient size and/or use of iterative reconstruction technique.



Comparison: None



Findings:



Vertebral body height maintained.   Straightening cervical spine.

 No acute fracture or subluxation. Multilevel cervical disc

disease noted and most pronounced at C5-C6 where there is mild to

moderate disc space height loss and tiny disc osteophyte

complexes producing mild spinal canal narrowing and mild

bilateral neural foraminal narrowing. Atherosclerosis carotid

arteries.



Impression:



No acute cervical fracture or subluxation identified.



Additional findings as above.



Electronically signed by:  Hakeem Saravia MD  4/21/2020 8:00 AM CDT

Workstation: [id number]

## 2020-04-21 NOTE — RAD
Study: 6 Views of the Right Foot and Right Ankle. 



Indication: fall from bed with pain 



Comparison: None.



Impression:



The posterior third of the calcaneus appears slightly shortened

with patchy sclerosis at this site. This is concerning for

age-indeterminate coronally oriented impaction fracture. Further

characterization with CT of the ankle and CT of the foot

recommended.



Ankle mortise alignment normal. Mild spurring inferior margin

medial meniscus and lateral malleolus. Calcaneal spurring at the

Achilles tendon insertion and plantar fascia origin.



No acute fracture of the forefoot or midfoot identified.



Mild soft tissue swelling of the ankle and hindfoot.



Electronically signed by:  Hakeem Saravia MD  4/21/2020 8:06 AM CDT

Workstation: [id number]

## 2020-04-21 NOTE — CT
EXAM DESCRIPTION: Lower Extremity



CLINICAL HISTORY: 70 years Male, right foot and ankle pain



COMPARISON: None.



TECHNIQUE: CT of the right foot and ankle was performed without

intravenous contrast administration. Sagittal and coronal

reconstructed reviewed.



This exam was performed according to our departmental

dose-optimization program, which includes automated exposure

control, adjustment of the mA and/or kV according to patient size

and/or use of iterative reconstruction technique.



FINDINGS: Moderate degenerative changes are identified in the

tibiotalar and subtalar joints. Sequelae of old trauma in the

posterior aspect of the calcaneus. Plantar and posterior

calcaneal spurs are noted. Old avulsion fracture of the lateral

malleolus is also noted.



No acute fracture or dislocation.



CT is limited for evaluation of soft tissue structures, however

no acute abnormality is noted. Calcification is identified in the

region of the peroneal tendons which could represent sequelae of

prior trauma as well. Heterotopic ossification is identified

within the tarsal tunnel with possible mass effect on the

vasculature and nerves.



1.6 x 1.8 cm cystic lesion is noted medial to the anterior body

of the calcaneus probably representing a ganglion cyst.



IMPRESSION:



1. Sequelae of old trauma are noted in the calcaneus and lateral

malleolar regions.

2. Degenerative changes are identified in the tibiotalar and

subtalar joints.

3. Calcification is identified in the region of the peroneal

tendons which could represent sequelae of prior trauma as well.

Heterotopic ossification is identified within the tarsal tunnel

with possible mass effect on the neurovascular bundle.

4. 1.6 x 1.8 cm cystic lesion is noted medial to the anterior

body of the calcaneus probably representing a ganglion cyst.



Electronically signed by:  Beverly Weston MD  4/21/2020 9:57 AM

CDT Workstation: [id number]

## 2020-04-21 NOTE — RAD
Study: 6 Views of the Right Foot and Right Ankle. 



Indication: fall from bed with pain 



Comparison: None.



Impression:



The posterior third of the calcaneus appears slightly shortened

with patchy sclerosis at this site. This is concerning for

age-indeterminate coronally oriented impaction fracture. Further

characterization with CT of the ankle and CT of the foot

recommended.



Ankle mortise alignment normal. Mild spurring inferior margin

medial meniscus and lateral malleolus. Calcaneal spurring at the

Achilles tendon insertion and plantar fascia origin.



No acute fracture of the forefoot or midfoot identified.



Mild soft tissue swelling of the ankle and hindfoot.



Electronically signed by:  Hakeem Saravia MD  4/21/2020 8:06 AM CDT

Workstation: [id number]

## 2020-07-22 ENCOUNTER — HOSPITAL ENCOUNTER (INPATIENT)
Dept: HOSPITAL 39 - ER | Age: 70
LOS: 3 days | Discharge: NURSING, REHAB OR LONG-TERM CARE | DRG: 177 | End: 2020-07-25
Attending: NURSE PRACTITIONER | Admitting: NURSE PRACTITIONER
Payer: MEDICARE

## 2020-07-22 DIAGNOSIS — Z88.0: ICD-10-CM

## 2020-07-22 DIAGNOSIS — K21.9: ICD-10-CM

## 2020-07-22 DIAGNOSIS — G20: ICD-10-CM

## 2020-07-22 DIAGNOSIS — Z87.891: ICD-10-CM

## 2020-07-22 DIAGNOSIS — R09.02: ICD-10-CM

## 2020-07-22 DIAGNOSIS — I10: ICD-10-CM

## 2020-07-22 DIAGNOSIS — J12.89: ICD-10-CM

## 2020-07-22 DIAGNOSIS — I25.119: ICD-10-CM

## 2020-07-22 DIAGNOSIS — Z79.899: ICD-10-CM

## 2020-07-22 DIAGNOSIS — E03.9: ICD-10-CM

## 2020-07-22 DIAGNOSIS — Z88.5: ICD-10-CM

## 2020-07-22 DIAGNOSIS — U07.1: Primary | ICD-10-CM

## 2020-07-22 DIAGNOSIS — Z79.891: ICD-10-CM

## 2020-07-22 DIAGNOSIS — Z79.1: ICD-10-CM

## 2020-07-22 RX ADMIN — ENOXAPARIN SODIUM SCH MG: 40 INJECTION, SOLUTION INTRAVENOUS; SUBCUTANEOUS at 20:24

## 2020-07-22 RX ADMIN — DEXAMETHASONE SODIUM PHOSPHATE SCH MG: 10 INJECTION INTRAMUSCULAR; INTRAVENOUS at 20:22

## 2020-07-22 RX ADMIN — AZITHROMYCIN DIHYDRATE SCH MLS/HR: 500 INJECTION, POWDER, LYOPHILIZED, FOR SOLUTION INTRAVENOUS at 20:24

## 2020-07-22 RX ADMIN — CEFTRIAXONE SCH MLS/HR: 1 INJECTION, POWDER, FOR SOLUTION INTRAMUSCULAR; INTRAVENOUS at 20:23

## 2020-07-22 RX ADMIN — ACETAMINOPHEN PRN MG: 325 TABLET ORAL at 21:00

## 2020-07-22 NOTE — HP
SUPERVISING PHYSICIAN:  Sandeep Peace M.D.



CHIEF COMPLAINT:  Shortness of breath.



HISTORY OF PRESENT ILLNESS:  Mr. Lindsay is a 70 year-old male patient with a 
significant medical history of Parkinson's, hypertension, gastroesophageal 
reflux disease, coronary artery disease, hypothyroidism and chronic angina.  He 
resides at [facility] where there are currently numerous positive 
cases of COVID-19.  He was sent to the Emergency Room for evaluation of 
shortness of breath at the nursing home.  As noted on initial assessment, vital 
signs showed that he was afebrile but he was satting 88% on room air and is not 
normally O2 dependent.  His chest x-ray showed a new opacification in the right 
upper and mid lower lung which could represent pulmonary edema versus atypical 
pneumonia or viral infection.  He was tested on respiratory panel and found to 
be positive for COVID-19, and now is going to be admitted for treatment of 
COVID-19.  The patient was admitted in stable condition.



PAST MEDICAL HISTORY: 

1.  Parkinson's syndrome.

2.  Hypertension.

3.  Gastroesophageal reflux disease.

4.  Coronary artery disease.

5.  Hypothyroidism.

6.  Chronic angina.



PAST SURGICAL HISTORY: 

1.  Carpal tunnel release.

2.  Cancerous lesion removed from face.



CURRENT MEDICATIONS:  Awaiting an updated list of verification of medications 
from the electronic medical records from the nursing home.



ALLERGIES:  AMOXICILLIN AND CODEINE.



FAMILY HISTORY:  Noncontributory.



SOCIAL HISTORY:   The patient lives in [place] at [facility].  He quit 
smoking in 2015.  Has no history of alcohol or illicit drug use.  



REVIEW OF SYSTEMS: 

CONSTITUTIONAL:  Positive for general malaise and weakness.

HEENT:  Denies any ear aches, sore throat, nasal congestion, headaches or vision
changes.

RESPIRATORY:  Positive for shortness of breath as noted in History of Present 
Illness with no noted cough.

CARDIOVASCULAR:  Denies any chest pains, palpitations or syncopal episodes.  

GASTROINTESTINAL:  Denies any nausea, vomiting, diarrhea, constipation or 
abdominal pain.  

GENITOURINARY:  Denies any dysuria, hematuria, polyuria.  

MUSCULOSKELETAL:  No reported muscle stiffness.  Does have chronic issues as 
noted in History of Present Illness. 

SKIN:  No reported lesions, rashes, moles or unexplained changes.

NEUROLOGIC:  Positive for ataxia related to his Parkinson's.  No reported 
seizures.  No other focal neuro deficits noted on review of systems.

HEMATOLOGIC:  Denies any easy bruising or unexplained bleeding or transfusion 
reactions.



PHYSICAL EXAMINATION: 



VITAL SIGNS:  Temperature 97.4, pulse 65, blood pressure 121/85, respirations 20
to 22, satting 98% on room air to 95% on 2 liters nasal cannula.



GENERAL:  The patient does look unkempt.  He is a little lethargic but he is 
answering questions.  He does not appear in any acute distress.



HEENT: Tympanic membranes clear bilaterally.  Oropharynx is pink with mildly dry
mucosal membranes.  No lesions.



NECK: Supple, nontender with full range of motion.  No jugular venous distention
noted.  



CHEST:  Lung sounds are fairly clear, just decreased towards the bases.  No 
obvious rhonchi, wheezing or rales.



HEART:  Regular rate and rhythm without appreciable murmurs, gallops, or rubs.  



ABDOMEN: Soft, nontender.  Positive bowel sounds.  

 

EXTREMITIES:  There is no noted cyanosis, clubbing or edema.  



SKIN:  Warm, pink and dry.



EKG showed sinus bradycardia with first degree AV block with no ST or T wave 
changes to indicate ischemia or acute injury pattern.



LABORATORY:  White count 4,100, hemoglobin 12.2, hematocrit 36.6.  No left shift
noted initially on admission.  Coagulation studies showed a PT of 11.5, INR 1.6,
PTT of 25.5, fibrinogen and D-dimer were pending.  Blood gas was done in the 
Emergency Room on 2 liters nasal cannula showing a pH of 7.4 with a pO2 of 79, 
pCO2 of 32, satting 95%.  Initial chemistries on admission showed a mildly low 
sodium at 134, otherwise electrolytes were within normal limits.  Creatinine was
1.03.  Serum lactic acid was 1.4, calcium 8.7.  AST and ALT were all normal.  CK
was elevated at 523, troponin was less than 0.02.  C reactive protein was 
elevated at 7.  Urinalysis was pending.



MICROBIOLOGY:  Respiratory panel was positive for COVID-19.  Blood cultures are 
pending.



RADIOLOGY:  Single view chest per radiology interpretation showed a new 
opacification in the right upper and mid lower lung zones which could represent 
pulmonary edema versus atypical or viral infection.  Please see that report for 
details.



ASSESSMENT: 

1.   COVID-19 pneumonia.

2.   Hypoxia on room air with no history of O2 dependency, likely secondary to 
#1.

3.   History of Parkinson's syndrome.

4.   Hypertension.

5.   Gastroesophageal reflux disease.

6.   Coronary artery disease.

7.   Hypothyroidism.

8.   Chronic angina.



PLAN:  Mr. Lindsay is going to be admitted for treatment of COVID-19 pneumonia. 
He was treated with protocol for guidelines with oxygen.  Will start him on 
Decadron 6 mg daily with azithromycin and Rocephin.  He will have breathing 
treatments as needed with a handihaler.  Will have bronchial hygiene 
established.  Will repeat his labs as per protocol.  Anticipate length of stay 
to be anywhere from 3 to 7 days.  Until we can transition to outpatient 
management will continue to monitor and treat as needed.



#[id number]

[facility]

## 2020-07-22 NOTE — ED.PDOC
History of Present Illness





- General


Chief Complaint: Neuro Symptoms/Deficits


Stated Complaint: SOB


Time Seen by Provider: 20 12:55


Source: patient, RN notes reviewed, Vital Signs reviewed, nursing home records


Exam Limitations: clinical condition





- History of Present Illness


Initial Comments: 





71 y/o male with Parkinson's brought from NH for low 02 sats.  He says they pro

bably sent him here because he wouldn't answer them and he was sleepy.  He 

denies fever, cough or even feeling SOB.


Timing/Duration: 24 hours


Severity: moderate


Worsening Factors: movement


Associated Symptoms: shortness of breath, weakness


Allergies/Adverse Reactions: 


Allergies





Amoxicillin Allergy (Verified 19 14:18)


   


Codeine Allergy (Verified 19 14:18)


   





Home Medications: 


Ambulatory Orders





Albuterol Sulfate Nebs [Proventil Nebs] 2.5 mg NEB Q6HR PRN 20 


Albuterol Sulfate [Ventolin Hfa] 108 mcg INH Q6HR PRN 20 


Ascorbic Acid [Vitamin C] 1,000 mg PO DAILY 20 


Atorvastatin Calcium [Lipitor] 10 mg PO BEDTIME 20 


Carbidopa-Levodopa [Carbidopa/Levodopa  mg] 2 tab PO 5XD 20 


Cyanocobalamin [Vitamin B12] 1,000 mcg PO DAILY 20 


Escitalopram Oxalate 15 mg PO BEDTIME 20 


Fluticasone Propionate (Nasal) [Fluticasone Propionate] 50 mcg EMIL BID 20 


Gabapentin 600 mg PO TID 20 


Guaifenesin 400 mg PO BID 20 


Hydroxyzine HCl 25 mg PO TID 20 


Levothyroxine Sodium [Synthroid] 100 mcg PO 0600 20 


Lisinopril 20 mg PO DAILY 20 


Meloxicam 15 mg PO DAILY 20 


Menthol (Topical Analgesic) [Arctic Relief Roll-On Mingo] 1 dose TOP BID 20 


Multiple Vitamin 1 tab PO DAILY 20 


Polyethylene Glycol 3350 1 pow PO DAILY 20 


Tramadol HCl 50 mg PO Q6H PRN 20 


Trazodone HCl [Trazodone Hydrochloride] 25 mg PO BEDTIME 20 


Acetaminophen [Tylenol] 650 mg PO PRN 20 


Albuterol Inhaler [Ventolin Hfa Inhaler] 2 inh IN PRN 20 


Ciclopirox 1 % EX PRN 20 


Ibuprofen [Ibu] 800 mg PO PRN 20 


Lidocaine 5% Patch [Lidoderm Patch] 1 ea TOP DAILY 20 


Magnesium Hydroxide [Milk Of Magnesia] 30 ml PO PRN 20 


Polyvinyl Alcohol [Artificial Tears] 1 drop BOTH_EYES PRN 20 


Triamcinolone 0.1% Oint [Kenalog 0.1% Ointment] 1 applic TOP PRN 20 











Review of Systems





- Review of Systems


Constitutional: States: weakness


EENTM: States: no symptoms reported


Respiratory: States: short of breath


Cardiology: States: no symptoms reported


Gastrointestinal/Abdominal: States: no symptoms reported


Genitourinary: States: no symptoms reported


Musculoskeletal: States: no symptoms reported, muscle stiffness, other - 

parkinson's


Skin: States: dryness, rash


Neurological: States: other - parkinson's


Endocrine: States: no symptoms reported





Past Medical History (General)





- Patient Medical History


Hx Seizures: No


Hx Stroke: No


Hx Asthma: No


Hx of COPD: No


Hx Cardiac Disorders: Yes - Angina


Hx Congestive Heart Failure: No


Hx Pacemaker: No


Hx Hypertension: Yes


Hx Thyroid Disease: Yes


Hx Diabetes: Yes


Hx Gastroesophageal Reflux: Yes


Hx Cancer: No


Hx Hepatitis C: No


Hx MRSA: No





- Vaccination History


Hx Tetanus, Diphtheria Vaccination:  - unknown


Hx Influenza Vaccination: Yes


Hx Pneumococcal Vaccination:  - unknown





- Social History


Hx Tobacco Use: Yes


Hx Alcohol Use: Yes


Hx Substance Use: No


Hx Substance Use Treatment: No


Hx Depression: Yes


Hx Physical Abuse: No


Hx Emotional Abuse: No





Family Medical History





- Family History


  ** Mother


Family History: Unknown


Living Status: 





  ** Father


Living Status: 





Physical Exam





- Physical Exam


General Appearance: Alert, No apparent distress, Lethargic, Unkempt


Eye Exam: bilateral normal


Ears, Nose, Throat: normal ENT inspection


Neck: normal inspection, limited range of motion, other


Respiratory: chest non-tender, lungs clear, no respiratory distress, no 

accessory muscle use, decreased breath sounds


Cardiovascular/Chest: regular rate, rhythm, no edema, no murmur


Gastrointestinal/Abdominal: normal bowel sounds, non tender, soft, no 

organomegaly


Extremity: other - increased tone


Skin Exam: rash - flaking skin to whole body





Progress





- Progress


Progress: 





20 13:35


EKG:  Sinus bradycardia 1st degree AV block, no ST elevation, QTc 449





Departure





- Departure


Clinical Impression: 


 Pulmonary infiltrate





Disposition: Admit Patient


Condition: Fair


Departure Forms:  ED Discharge - Pt. Copy, Patient Portal Self Enrollment


Referrals: 


Derick Mason MD [Primary Care Provider] - 1-2 Weeks


Home Medications: 


Ambulatory Orders





Albuterol Sulfate Nebs [Proventil Nebs] 2.5 mg NEB Q6HR PRN 20 


Albuterol Sulfate [Ventolin Hfa] 108 mcg INH Q6HR PRN 20 


Ascorbic Acid [Vitamin C] 1,000 mg PO DAILY 20 


Atorvastatin Calcium [Lipitor] 10 mg PO BEDTIME 20 


Carbidopa-Levodopa [Carbidopa/Levodopa  mg] 2 tab PO 5XD 20 


Cyanocobalamin [Vitamin B12] 1,000 mcg PO DAILY 20 


Escitalopram Oxalate 15 mg PO BEDTIME 20 


Fluticasone Propionate (Nasal) [Fluticasone Propionate] 50 mcg EMIL BID 20 


Gabapentin 600 mg PO TID 20 


Guaifenesin 400 mg PO BID 20 


Hydroxyzine HCl 25 mg PO TID 20 


Levothyroxine Sodium [Synthroid] 100 mcg PO 0600 20 


Lisinopril 20 mg PO DAILY 20 


Meloxicam 15 mg PO DAILY 20 


Menthol (Topical Analgesic) [Arctic Relief Roll-On Mingo] 1 dose TOP BID 20 


Multiple Vitamin 1 tab PO DAILY 20 


Polyethylene Glycol 3350 1 pow PO DAILY 20 


Tramadol HCl 50 mg PO Q6H PRN 20 


Trazodone HCl [Trazodone Hydrochloride] 25 mg PO BEDTIME 20 


Acetaminophen [Tylenol] 650 mg PO PRN 20 


Albuterol Inhaler [Ventolin Hfa Inhaler] 2 inh IN PRN 20 


Ciclopirox 1 % EX PRN 20 


Ibuprofen [Ibu] 800 mg PO PRN 20 


Lidocaine 5% Patch [Lidoderm Patch] 1 ea TOP DAILY 20 


Magnesium Hydroxide [Milk Of Magnesia] 30 ml PO PRN 20 


Polyvinyl Alcohol [Artificial Tears] 1 drop BOTH_EYES PRN 20 


Triamcinolone 0.1% Oint [Kenalog 0.1% Ointment] 1 applic TOP PRN 20

## 2020-07-22 NOTE — RAD
EXAM DESCRIPTION: Chest,1 View



CLINICAL HISTORY: 70 years Male, shortness of breath



COMPARISON: 2/12/2020



TECHNIQUE: Single view radiograph of the chest.



IMPRESSION:

Enlarged cardiac silhouette.



New opacification in the right upper and left mid-lower lung

which may represent pulmonary edema versus atypical or viral

infection. Elevation right hemidiaphragm.  Question of small left

pleural effusion. No pneumothorax although right lung apex not

included within the image margins.



Thoracic spondylosis.



Electronically signed by:  Sp Power MD  7/22/2020 1:09 PM CDT

Workstation: [id number]

## 2020-07-23 RX ADMIN — ESCITALOPRAM OXALATE SCH MG: 10 TABLET ORAL at 20:58

## 2020-07-23 RX ADMIN — GUAIFENESIN SCH MG: 600 TABLET, EXTENDED RELEASE ORAL at 11:11

## 2020-07-23 RX ADMIN — AZITHROMYCIN DIHYDRATE SCH MLS/HR: 500 INJECTION, POWDER, LYOPHILIZED, FOR SOLUTION INTRAVENOUS at 19:44

## 2020-07-23 RX ADMIN — CEFTRIAXONE SCH MLS/HR: 1 INJECTION, POWDER, FOR SOLUTION INTRAMUSCULAR; INTRAVENOUS at 19:43

## 2020-07-23 RX ADMIN — LISINOPRIL SCH MG: 10 TABLET ORAL at 11:10

## 2020-07-23 RX ADMIN — ALBUTEROL SULFATE SCH PUFF: 90 AEROSOL, METERED RESPIRATORY (INHALATION) at 12:00

## 2020-07-23 RX ADMIN — GUAIFENESIN SCH MG: 600 TABLET, EXTENDED RELEASE ORAL at 21:00

## 2020-07-23 RX ADMIN — ALBUTEROL SULFATE SCH PUFF: 90 AEROSOL, METERED RESPIRATORY (INHALATION) at 16:00

## 2020-07-23 RX ADMIN — PANTOPRAZOLE SODIUM SCH MG: 40 INJECTION, POWDER, FOR SOLUTION INTRAVENOUS at 05:53

## 2020-07-23 RX ADMIN — GABAPENTIN SCH MG: 300 CAPSULE ORAL at 21:00

## 2020-07-23 RX ADMIN — CARBIDOPA AND LEVODOPA SCH EA: 25; 100 TABLET ORAL at 17:55

## 2020-07-23 RX ADMIN — ALBUTEROL SULFATE SCH PUFF: 90 AEROSOL, METERED RESPIRATORY (INHALATION) at 21:05

## 2020-07-23 RX ADMIN — GABAPENTIN SCH MG: 300 CAPSULE ORAL at 14:29

## 2020-07-23 RX ADMIN — CARBIDOPA AND LEVODOPA SCH EA: 25; 100 TABLET ORAL at 11:33

## 2020-07-23 RX ADMIN — DEXAMETHASONE SODIUM PHOSPHATE SCH MG: 10 INJECTION INTRAMUSCULAR; INTRAVENOUS at 09:31

## 2020-07-23 RX ADMIN — CARBIDOPA AND LEVODOPA SCH EA: 25; 100 TABLET ORAL at 14:31

## 2020-07-23 RX ADMIN — ATORVASTATIN CALCIUM SCH MG: 10 TABLET, FILM COATED ORAL at 20:59

## 2020-07-23 RX ADMIN — CARBIDOPA AND LEVODOPA SCH EA: 25; 100 TABLET ORAL at 22:23

## 2020-07-23 RX ADMIN — ALBUTEROL SULFATE SCH PUFF: 90 AEROSOL, METERED RESPIRATORY (INHALATION) at 09:30

## 2020-07-23 RX ADMIN — FLUTICASONE PROPIONATE SCH SPRAY: 50 SPRAY, METERED NASAL at 20:58

## 2020-07-23 RX ADMIN — ENOXAPARIN SODIUM SCH MG: 40 INJECTION, SOLUTION INTRAVENOUS; SUBCUTANEOUS at 20:59

## 2020-07-23 NOTE — PN
SUPERVISING PHYSICIAN:  Sandeep Peace M.D.



DATE:  7/23/20



SUBJECTIVE:  The patient is sitting in bed.  He is trying to get up but is 
unable to due to his Parkinson's disease.  He is confused, but per nursing he is
clinically improving.



OBJECTIVE:

VITAL SIGNS:  Temperature 97.4, heart rate 83, blood pressure 123/72, 
respiratory rate 20 to 30 breaths-per-minute.  O2 saturation is 94% on 2 liters 
nasal cannula.

RESPIRATORY:  Diminished breath sounds throughout.  He does get tachypneic at 
times and his respirations are shallow.  He can be quite tachypneic.

CARDIAC:  Regular rate and rhythm.

GASTROINTESTINAL:  Abdomen is soft, nondistended, non-tender.  Bowel sounds are 
positive.

NEUROLOGIC:  He is awake and oriented to person only.



LABORATORY:  WBCs are 4,500 with hemoglobin 12.2, hematocrit 35.4, lymphocytes 
are 0.6 and 12.3%.  Fibrinogen has worsened to 550.  

D-dimer is pending.  Sodium 132.  LDH has increased to 281.  C reactive protein 
has worsened to 8.6.  Preliminary blood cultures show no growth after 24 hours.



All other labs and films have been reviewed via the EMR.



ASSESSMENT: 

1.   COVID-19 pneumonia.

2.   Hypoxia on room air with no history of O2 dependency, likely secondary to 
#1.

3.   History of Parkinson's syndrome.

4.   Hypertension.

5.   Gastroesophageal reflux disease.

6.   Coronary artery disease.

7.   Hypothyroidism.

8.   Chronic angina.



PLAN:  We will continue present supportive care.  Will continue to monitor his 
cultures as well as his labs.  Will continue on his antibiotics and COVID 
protocol.  I will repeat his labs and chest x-ray in the morning.  His home 
medications have been restarted.  Will continue to monitor closely and follow as
needed.



#[id number]

MTDD

## 2020-07-24 RX ADMIN — CARBIDOPA AND LEVODOPA SCH EA: 25; 100 TABLET ORAL at 09:13

## 2020-07-24 RX ADMIN — LISINOPRIL SCH MG: 10 TABLET ORAL at 09:13

## 2020-07-24 RX ADMIN — POLYETHYLENE GLYCOL 3350 SCH GM: 17 POWDER, FOR SOLUTION ORAL at 14:36

## 2020-07-24 RX ADMIN — ESCITALOPRAM OXALATE SCH MG: 10 TABLET ORAL at 20:30

## 2020-07-24 RX ADMIN — CARBIDOPA AND LEVODOPA SCH EA: 25; 100 TABLET ORAL at 14:41

## 2020-07-24 RX ADMIN — LEVOTHYROXINE SODIUM SCH MG: 75 TABLET ORAL at 06:15

## 2020-07-24 RX ADMIN — CEFTRIAXONE SCH MLS/HR: 1 INJECTION, POWDER, FOR SOLUTION INTRAMUSCULAR; INTRAVENOUS at 19:28

## 2020-07-24 RX ADMIN — ALBUTEROL SULFATE SCH: 90 AEROSOL, METERED RESPIRATORY (INHALATION) at 17:57

## 2020-07-24 RX ADMIN — PANTOPRAZOLE SODIUM SCH MG: 40 INJECTION, POWDER, FOR SOLUTION INTRAVENOUS at 06:15

## 2020-07-24 RX ADMIN — FLUTICASONE PROPIONATE SCH SPRAY: 50 SPRAY, METERED NASAL at 20:30

## 2020-07-24 RX ADMIN — ENOXAPARIN SODIUM SCH MG: 40 INJECTION, SOLUTION INTRAVENOUS; SUBCUTANEOUS at 20:30

## 2020-07-24 RX ADMIN — ATORVASTATIN CALCIUM SCH MG: 10 TABLET, FILM COATED ORAL at 20:30

## 2020-07-24 RX ADMIN — AZITHROMYCIN DIHYDRATE SCH MLS/HR: 500 INJECTION, POWDER, LYOPHILIZED, FOR SOLUTION INTRAVENOUS at 19:29

## 2020-07-24 RX ADMIN — ALBUTEROL SULFATE SCH PUFF: 90 AEROSOL, METERED RESPIRATORY (INHALATION) at 20:30

## 2020-07-24 RX ADMIN — CARBIDOPA AND LEVODOPA SCH EA: 25; 100 TABLET ORAL at 06:15

## 2020-07-24 RX ADMIN — GUAIFENESIN SCH MG: 600 TABLET, EXTENDED RELEASE ORAL at 09:12

## 2020-07-24 RX ADMIN — ALBUTEROL SULFATE SCH PUFF: 90 AEROSOL, METERED RESPIRATORY (INHALATION) at 11:50

## 2020-07-24 RX ADMIN — ACETAMINOPHEN PRN MG: 325 TABLET ORAL at 09:11

## 2020-07-24 RX ADMIN — CARBIDOPA AND LEVODOPA SCH EA: 25; 100 TABLET ORAL at 17:49

## 2020-07-24 RX ADMIN — ALBUTEROL SULFATE SCH PUFF: 90 AEROSOL, METERED RESPIRATORY (INHALATION) at 08:30

## 2020-07-24 RX ADMIN — GABAPENTIN SCH MG: 300 CAPSULE ORAL at 09:12

## 2020-07-24 RX ADMIN — CARBIDOPA AND LEVODOPA SCH EA: 25; 100 TABLET ORAL at 22:39

## 2020-07-24 RX ADMIN — GABAPENTIN SCH MG: 300 CAPSULE ORAL at 20:31

## 2020-07-24 RX ADMIN — GUAIFENESIN SCH MG: 600 TABLET, EXTENDED RELEASE ORAL at 20:31

## 2020-07-24 RX ADMIN — DEXAMETHASONE SODIUM PHOSPHATE SCH MG: 10 INJECTION INTRAMUSCULAR; INTRAVENOUS at 09:10

## 2020-07-24 RX ADMIN — FLUTICASONE PROPIONATE SCH SPRAY: 50 SPRAY, METERED NASAL at 09:20

## 2020-07-24 RX ADMIN — GABAPENTIN SCH MG: 300 CAPSULE ORAL at 14:42

## 2020-07-24 NOTE — RAD
EXAM DESCRIPTION: 



Chest,1 View



CLINICAL HISTORY: 

covid



COMPARISON: 

Chest radiograph dated July 22, 2020



TECHNIQUE: 

1 view radiograph of the chest



FINDINGS: 

Cardiac silhouette shows upper limits of normal heart size.

Pulmonary vascularity is within normal limits.

Mild interval improvement of hazy opacity in the right upper lobe

and left mid lung compared to July 22, 2020.

Better visualization of the left hemidiaphragm compared to July 22, 2020, suspect improved pleural small left pleural effusion.

There is no pneumothorax.

No acute osseous abnormality.



IMPRESSION: 

1. Mild interval improvement of hazy opacity in the right upper

lobe and left mid lung compared to July 22, 2020. Findings

compatible with improving pneumonia.

2. Suspect small left pleural effusion, improved compared to July 22, 2020.



Electronically signed by:  Royal Dunn MD  7/24/2020 11:41 AM CDT

Workstation: [id number]

## 2020-07-25 VITALS — OXYGEN SATURATION: 94 % | DIASTOLIC BLOOD PRESSURE: 78 MMHG | TEMPERATURE: 98.1 F | SYSTOLIC BLOOD PRESSURE: 147 MMHG

## 2020-07-25 RX ADMIN — FLUTICASONE PROPIONATE SCH SPRAY: 50 SPRAY, METERED NASAL at 09:49

## 2020-07-25 RX ADMIN — LISINOPRIL SCH MG: 10 TABLET ORAL at 09:48

## 2020-07-25 RX ADMIN — GABAPENTIN SCH MG: 300 CAPSULE ORAL at 09:48

## 2020-07-25 RX ADMIN — CARBIDOPA AND LEVODOPA SCH EA: 25; 100 TABLET ORAL at 05:33

## 2020-07-25 RX ADMIN — ALBUTEROL SULFATE SCH PUFF: 90 AEROSOL, METERED RESPIRATORY (INHALATION) at 09:40

## 2020-07-25 RX ADMIN — GUAIFENESIN SCH MG: 600 TABLET, EXTENDED RELEASE ORAL at 09:47

## 2020-07-25 RX ADMIN — POLYETHYLENE GLYCOL 3350 SCH GM: 17 POWDER, FOR SOLUTION ORAL at 09:48

## 2020-07-25 RX ADMIN — PANTOPRAZOLE SODIUM SCH MG: 40 INJECTION, POWDER, FOR SOLUTION INTRAVENOUS at 05:33

## 2020-07-25 RX ADMIN — LEVOTHYROXINE SODIUM SCH MG: 75 TABLET ORAL at 05:33

## 2020-07-25 RX ADMIN — DEXAMETHASONE SODIUM PHOSPHATE SCH MG: 10 INJECTION INTRAMUSCULAR; INTRAVENOUS at 09:48

## 2020-07-25 RX ADMIN — CARBIDOPA AND LEVODOPA SCH EA: 25; 100 TABLET ORAL at 09:54

## 2020-07-25 RX ADMIN — ALBUTEROL SULFATE SCH: 90 AEROSOL, METERED RESPIRATORY (INHALATION) at 14:22

## 2020-07-25 NOTE — PN
SUPERVISING PHYSICIAN:  Sandeep Peace M.D.



DATE:  7/24/20



SUBJECTIVE:  The patient is sitting in bed.  He has no complaints. There have 
been no issues reported per nursing. 



OBJECTIVE:

VITAL SIGNS:  Temperature 98.8, heart rate 77, blood pressure 135/71, 
respiratory rate 21 to 24,  O2 saturation is 93% on 2 liters nasal cannula.

RESPIRATORY:  Diminished at the bases with a few scattered rhonchi..

CARDIAC:  Regular rate and rhythm.

GASTROINTESTINAL:  Abdomen is soft, nondistended, non-tender..

NEUROLOGIC:  He is awake and oriented to person only.



LABORATORY:  WBCs are 9.3 with hemoglobin 11.5, hematocrit 34.  Fibrinogen is 
508.  D-dimer is normal at 244.  Electrolytes are within normal limits with LDH 
of 243.  Troponin 0.02.  C-reactive protein 4.5.  Intelligence blood cultures 
show no growth,



Chest x-ray:

1.  Mild interval improvement of hazy opacity in her right upper lobe and left

     midlung compared to 07/22/20.  Findings compatible with improving 
pneumonia.

2.  Suspect small left pleural effusion, improved compared to 07/22/20.



All other labs and films have been reviewed via the EMR.



ASSESSMENT: 

1.   COVID-19 pneumonia.

2.   Hypoxia on room air with no history of O2 dependency, likely secondary to 
#1.

3.   History of Parkinson's syndrome.

4.   Hypertension.

5.   Gastroesophageal reflux disease.

6.   Coronary artery disease.

7.   Hypothyroidism.

8.   Chronic angina.



PLAN:  We will continue present supportive care including his antibiotics, 
steroids and his aggressive pulmonary hygiene.  I have ordered lab for the in 
morning.  Will hold for chest x-ray until Sunday.  Will continue to monitor 
closely and follow as needed.



#[id number]

[facility]

## 2020-07-25 NOTE — DS
SUPERVISING PHYSICIAN:  Sandeep Peace M.D.



DISCHARGE DIAGNOSIS: 

1.   COVID-19 pneumonia.

2.   Hypoxia on room air with no history of O2 dependency, likely secondary to 
#1.

3.   History of Parkinson's syndrome.

4.   Hypertension.

5.   Gastroesophageal reflux disease.

6.   Coronary artery disease.

7.   Hypothyroidism.

8.   Chronic angina.



HISTORY OF PRESENT ILLNESS:  This is a 70 year-old male patient who lives at 
[facility].  He has a significant medical history of Parkinson's, 
hypertension, gastroesophageal reflux disease and coronary artery disease.  He 
tested positive for COVID-19 and was short of breath at the facility.  He was 
sent to the Emergency Room.  He was afebrile but was satting 88% on room air.  
He is not normally O2 dependent.  A chest x-ray showed a new opacification in 
the  right upper and mid lower lung that could represent pulmonary edema versus 
atypical pneumonia or viral infection.  He was found to be positive for COVID-
19, and was admitted to the hospital for treatment of COVID pneumonia. 



HOSPITAL COURSE:  The patient was admitted and started on the COVID pneumonia 
protocol.  He was given azithromycin and Rocephin.  He was also put on IV 
Decadron as well as breathing treatments both p.r.n. and as needed.  He was 
given aggressive pulmonary hygiene.  His labs were monitored closely.  He 
actually slowly but steadily progressively improved clinically.  He was weaned 
off of his oxygen and his home medications were restarted.  His labs have 
trended to normal and have mostly normalized.  His chest x-ray has improved.  
Vital signs have stabilized and he is on room air.  He will be discharged back 
to [facility] to their COVID unit.



LABORATORY:  WBCs started at 4,100 and today are 7,600.  His H&H is stable at 
11.4 and 33.5.  He did have a left shift on his differential several days prior 
to discharge, but it is normalized now.  Fibrinogen went as high as 550 and is 
now trending down to 475.  D-dimer is normal at 244.  ABG showed pO2 of 79 with 
pCO2 of 32.  He did have some mild hyponatremia with a sodium of 134, potassium 
is stable at 3.8, chloride at 104, carbon dioxide at 20 to 22, creatinine was 
normal at 0.72, calcium 8.6, magnesium 2.2.  LDH was 281 and is now 233.  
Troponin was normal at less than 0.02.  C reactive protein at 8.6 that is down 
to 5.8.  Preliminary blood cultures show no growth after 3 days, although he did
have 1 positive anaerobic culture that showed gram positive cocci.  The culture 
and sensitivity is still pending at this time.  



Final chest x-ray showed:

1.   Mild improvement of hazy opacity in the right upper lobe and left mid lung 

      compared to 7/22/20.  Findings compatible with improving pneumonia.

2.   Suspect small left pleural effusion.



DISCHARGE PLAN:  The patient will be discharged back to [facility] 
in stable condition.  He is to resume his previous diet and increase his 
activity as tolerated.  He is to followup with Dr. Mason in the next 1 to 2 
weeks.  It is recommended that he have another chest 

x-ray, CRP, CMP, CBC, LDH and fibrinogen in approximately 5 to 7 days.  It is 
also recommended that we followup with that positive blood culture with the 
final CNS report.  In addition to his routine medications, he has also been sent
home on Align, Decadron, Eliquis, Guaifenesin, Cefdinir, Albuterol and 
azithromycin.  He is to return to the hospital or followup with Dr. Mason for 
any problems or complications.



DISCHARGE MEDICATIONS:

1.   Levothyroxine.

2.   Trazodone.

3.   Tramadol.

4.   Multivitamin.

5.   Meloxicam.

6.   Lisinopril.

7.   Albuterol p.r.n.

8.   MiraLAX.

9.   Guaifenesin.

10. Gabapentin.

11. Fluticasone nasal.

12. Citalopram.

13. Cyanocobalamin.

14. Carbidopa Levodopa.

15. Atorvastatin.

16. Vitamin C.

17. Albuterol scheduled q.i.d.

18. Hydroxyzine.

19. Triamcinolone topical.

20. Acetaminophen.

21. Milk of Magnesia.

22. Ibuprofen.

23. Ciclopirox 1%.

24. Artificial Tears.

25. Align b.i.d.

26. Dexamethasone 4 mg daily for 7 days.

27. Eliquis 2.5 mg b.i.d. for 30 days.

28. Cefdinir 300 mg b.i.d. for 11 days.

29. Azithromycin 250 mg daily for 4 days.

30. Guaifenesin 600 mg b.i.d. for 30 days.

31. Albuterol inhaler 2 puffs 4 times daily.



#[id number]

[facility]

## 2020-11-29 ENCOUNTER — HOSPITAL ENCOUNTER (EMERGENCY)
Dept: HOSPITAL 39 - ER | Age: 70
Discharge: HOME HEALTH | End: 2020-11-29
Payer: MEDICARE

## 2020-11-29 VITALS — SYSTOLIC BLOOD PRESSURE: 125 MMHG | TEMPERATURE: 98.1 F | DIASTOLIC BLOOD PRESSURE: 52 MMHG

## 2020-11-29 VITALS — OXYGEN SATURATION: 95 %

## 2020-11-29 DIAGNOSIS — I10: ICD-10-CM

## 2020-11-29 DIAGNOSIS — G20: Primary | ICD-10-CM

## 2020-11-29 DIAGNOSIS — Z79.899: ICD-10-CM

## 2020-11-29 DIAGNOSIS — E07.9: ICD-10-CM

## 2020-11-29 DIAGNOSIS — F32.9: ICD-10-CM

## 2020-11-29 DIAGNOSIS — F02.80: ICD-10-CM

## 2020-11-29 DIAGNOSIS — Z86.19: ICD-10-CM

## 2020-11-29 DIAGNOSIS — Z88.1: ICD-10-CM

## 2020-11-29 DIAGNOSIS — Z88.5: ICD-10-CM

## 2020-11-29 DIAGNOSIS — Z87.891: ICD-10-CM

## 2020-11-29 DIAGNOSIS — K21.9: ICD-10-CM

## 2020-11-29 DIAGNOSIS — Z79.01: ICD-10-CM

## 2020-11-29 PROCEDURE — 87040 BLOOD CULTURE FOR BACTERIA: CPT

## 2020-11-29 PROCEDURE — 84484 ASSAY OF TROPONIN QUANT: CPT

## 2020-11-29 PROCEDURE — 93005 ELECTROCARDIOGRAM TRACING: CPT

## 2020-11-29 PROCEDURE — 85025 COMPLETE CBC W/AUTO DIFF WBC: CPT

## 2020-11-29 PROCEDURE — 83605 ASSAY OF LACTIC ACID: CPT

## 2020-11-29 PROCEDURE — 80053 COMPREHEN METABOLIC PANEL: CPT

## 2020-11-29 PROCEDURE — 71045 X-RAY EXAM CHEST 1 VIEW: CPT

## 2020-11-29 NOTE — RAD
CHEST, ONE VIEW XR  



CLINICAL HISTORY:  SOB



COMPARISON: 6/24/2020



TECHNIQUE: AP Chest.



FINDINGS: 



Cardiac size is upper normal.  Normal cardiomediastinal contours.

 Normal pulmonary vascularity.  Clear lungs and pleural spaces.  

Unremarkable soft tissues and bony structures.



IMPRESSION:



1. No acute chest disease.



Electronically signed by:  Sonya Velasco DO  11/29/2020 4:36 AM

[facility] Workstation: [id number]

## 2020-11-29 NOTE — ED.PDOC
History of Present Illness





- General


Chief Complaint: Respiratory Problem


Stated Complaint: difficulty breathing


Time Seen by Provider: 20 04:01





- History of Present Illness


Comments: 





PAT[facility] SENT FROM [facility] BECAUSE HE C/O SOB, PATIENT W/ COVID 3 MONTHS AGO, NEGATIVE

COVID TEST THIS WEEK. PT DENIES SOB IN THE ED ALTHOUGH HE IS MINIMALLY VERBAL 

AND IT IS DIFFICULT TO KNOW FOR SURE WHAT HIS INTENT IS. HE HAS ADVANCED 

DEMENTIA, [facility]. AS BEST AS CAN 

BE DETERMINED PATIENTS BEHAVIOUR IS AT HIS USUAL STATE ACCORDING TO REPORTS FROM

EMS AND NURSING STAFF THAT KNOW HIM. ACCORDING TO [facility] RECORDS HE HAS A HX OF 

PARKINSON'S DZ, SO AT LEAST TO SOME DEGREE, IT APPEARS HIS DEMENTIA IS LIKELY 

DUE TO PARKINSON'S DZ. 


Allergies/Adverse Reactions: 


Allergies





Amoxicillin Allergy (Verified 19 14:18)


   


Codeine Allergy (Verified 19 14:18)


   





Home Medications: 


Ambulatory Orders





Albuterol Sulfate Nebs [Proventil Nebs] 2.5 mg NEB Q6HR PRN 20 


Albuterol Sulfate [Ventolin Hfa] 108 mcg INH Q6HR PRN 20 


Ascorbic Acid [Vitamin C] 1,000 mg PO DAILY 20 


Atorvastatin Calcium [Lipitor] 10 mg PO BEDTIME 20 


Carbidopa-Levodopa [Carbidopa/Levodopa  mg] 2 tab PO 5XD 20 


Cyanocobalamin [Vitamin B12] 1,000 mcg PO DAILY 20 


Escitalopram Oxalate 15 mg PO BEDTIME 20 


Fluticasone Propionate (Nasal) [Fluticasone Propionate] 50 mcg BNAS BID 20




Gabapentin 600 mg PO TID 20 


Guaifenesin 400 mg PO BID 20 


Hydroxyzine HCl [Hydroxyzine Hydrochloride] 25 mg PO TID 20 


Levothyroxine Sodium [Synthroid] 100 mcg PO 0600 20 


Lisinopril 20 mg PO DAILY 20 


Meloxicam 15 mg PO DAILY 20 


Multiple Vitamin 1 tab PO DAILY 20 


Polyethylene Glycol 3350 1 pow PO DAILY 20 


Tramadol HCl 50 mg PO Q6H PRN 20 


Trazodone HCl [Trazodone Hydrochloride] 25 mg PO BEDTIME 20 


Acetaminophen [Tylenol] 650 mg PO Q6H PRN 20 


Albuterol Inhaler [Ventolin Hfa Inhaler] 2 inh IN PRN 20 


Ciclopirox 1 % EX PRN PRN 20 


Ibuprofen [Ibu] 800 mg PO Q8H PRN 20 


Magnesium Hydroxide [Milk Of Magnesia] 30 ml PO Q12H PRN 20 


Polyvinyl Alcohol [Artificial Tears] 1 drop BOTH_EYES Q6H PRN 20 


Triamcinolone 0.1% Oint [Kenalog 0.1% Ointment] 1 applic TOP PRN 20 


Cyclobenzaprine HCl [Cyclobenzaprine Hydrochlo] 5 mg PO Q8H PRN 20 


Lidocaine 4% Patch 1 ea TOP DAILY 20 


Albuterol Inhaler [Ventolin Hfa Inhaler] 2 puff INH QID #1 inh 20 


Apixaban [Eliquis] 2.5 mg PO BID 30 Days  tab 20 


Azithromycin Tab [Zithromax Tab] 250 mg PO QD #4 tab 20 


Bifidobacterium Infantis [Align] 4 mg PO BID #30 capsule 20 


Cefdinir [Omnicef] 300 mg PO BID #22 cap 20 


Dexamethasone Tab [Decadron Tab] 4 mg PO DAILY #7 tab 20 


Guaifenesin [Mucinex] 600 mg PO BID #60 tab 20 











Review of Systems





- Review of Systems


Unable to Obtain Due To: dementia





Past Medical History (General)





- Patient Medical History


Hx Seizures: No


Hx Stroke: No


Hx Asthma: No


Hx of COPD: No


Hx Cardiac Disorders: Yes - Angina


Hx Congestive Heart Failure: No


Hx Pacemaker: No


Hx Hypertension: Yes


Hx Thyroid Disease: Yes


Hx Diabetes: No


Hx Gastroesophageal Reflux: Yes


Hx Cancer: No


Hx Hepatitis C: No


Hx MRSA: No





- Vaccination History


Hx Tetanus, Diphtheria Vaccination:  - unknown


Hx Influenza Vaccination: Yes


Hx Pneumococcal Vaccination:  - unknown





- Social History


Hx Tobacco Use: Yes


Hx Alcohol Use: No


Hx Substance Use: No


Hx Substance Use Treatment: No


Hx Depression: Yes


Hx Physical Abuse: No


Hx Emotional Abuse: No





Family Medical History





- Family History


  ** Mother


Family History: Unknown


Living Status: 





  ** Father


Living Status: 





Physical Exam





- Physical Exam


General Appearance: No apparent distress


ENT Exam: normal ENT inspection


Neck: non-tender, full range of motion, supple


Respiratory: chest non-tender, lungs clear, normal breath sounds, no respiratory

 distress


Cardiovascular/Chest: normal peripheral pulses, regular rate, rhythm, no edema, 

no gallop, no JVD


Gastrointestinal/Abdominal: normal bowel sounds, non tender, soft, no 

organomegaly


Extremity: normal range of motion, non-tender, normal inspection


Skin Exam: normal color, warm/dry, cyanosis


Lymphatic: no adenopathy





Progress





- EKG/XRAY/CT


Comments: NO STEMI, NSR, NON-SPECIFIC CHANGES


XRAY: chest - NO ACUTE INFILTRATES, NORMAL





Departure





- Departure


Clinical Impression: 


Dementia


Qualifiers:


 Dementia type: Parkinson's disease Dementia behavioral disturbance: without 

behavioral disturbance Qualified Code(s): G20 - Parkinson's disease; F02.80 - 

Dementia in other diseases classified elsewhere without behavioral disturbance





Disposition: Discharge to Home for HH


Condition: Fair


Departure Forms:  ED Discharge - Pt. Copy, Patient Portal Self Enrollment


Referrals: 


RAYSHAWN LEDESMA [Primary Care Provider] - 1-2 Weeks


Home Medications: 


Ambulatory Orders





Albuterol Sulfate Nebs [Proventil Nebs] 2.5 mg NEB Q6HR PRN 20 


Albuterol Sulfate [Ventolin Hfa] 108 mcg INH Q6HR PRN 20 


Ascorbic Acid [Vitamin C] 1,000 mg PO DAILY 20 


Atorvastatin Calcium [Lipitor] 10 mg PO BEDTIME 20 


Carbidopa-Levodopa [Carbidopa/Levodopa  mg] 2 tab PO 5XD 20 


Cyanocobalamin [Vitamin B12] 1,000 mcg PO DAILY 20 


Escitalopram Oxalate 15 mg PO BEDTIME 20 


Fluticasone Propionate (Nasal) [Fluticasone Propionate] 50 mcg BNAS BID 20




Gabapentin 600 mg PO TID 20 


Guaifenesin 400 mg PO BID 20 


Hydroxyzine HCl [Hydroxyzine Hydrochloride] 25 mg PO TID 20 


Levothyroxine Sodium [Synthroid] 100 mcg PO 0600 20 


Lisinopril 20 mg PO DAILY 20 


Meloxicam 15 mg PO DAILY 20 


Multiple Vitamin 1 tab PO DAILY 20 


Polyethylene Glycol 3350 1 pow PO DAILY 20 


Tramadol HCl 50 mg PO Q6H PRN 20 


Trazodone HCl [Trazodone Hydrochloride] 25 mg PO BEDTIME 20 


Acetaminophen [Tylenol] 650 mg PO Q6H PRN 20 


Albuterol Inhaler [Ventolin Hfa Inhaler] 2 inh IN PRN 20 


Ciclopirox 1 % EX PRN PRN 20 


Ibuprofen [Ibu] 800 mg PO Q8H PRN 20 


Magnesium Hydroxide [Milk Of Magnesia] 30 ml PO Q12H PRN 20 


Polyvinyl Alcohol [Artificial Tears] 1 drop BOTH_EYES Q6H PRN 20 


Triamcinolone 0.1% Oint [Kenalog 0.1% Ointment] 1 applic TOP PRN 20 


Cyclobenzaprine HCl [Cyclobenzaprine Hydrochlo] 5 mg PO Q8H PRN 20 


Lidocaine 4% Patch 1 ea TOP DAILY 20 


Albuterol Inhaler [Ventolin Hfa Inhaler] 2 puff INH QID #1 inh 20 


Apixaban [Eliquis] 2.5 mg PO BID 30 Days  tab 20 


Azithromycin Tab [Zithromax Tab] 250 mg PO QD #4 tab 20 


Bifidobacterium Infantis [Align] 4 mg PO BID #30 capsule 20 


Cefdinir [Omnicef] 300 mg PO BID #22 cap 20 


Dexamethasone Tab [Decadron Tab] 4 mg PO DAILY #7 tab 20 


Guaifenesin [Mucinex] 600 mg PO BID #60 tab 20

## 2020-12-11 ENCOUNTER — HOSPITAL ENCOUNTER (EMERGENCY)
Dept: HOSPITAL 39 - ER | Age: 70
Discharge: NURSING, REHAB OR LONG-TERM CARE | End: 2020-12-11
Payer: MEDICARE

## 2020-12-11 VITALS — OXYGEN SATURATION: 100 %

## 2020-12-11 VITALS — TEMPERATURE: 97.8 F

## 2020-12-11 VITALS — SYSTOLIC BLOOD PRESSURE: 158 MMHG | DIASTOLIC BLOOD PRESSURE: 90 MMHG

## 2020-12-11 DIAGNOSIS — Z20.828: ICD-10-CM

## 2020-12-11 DIAGNOSIS — I10: ICD-10-CM

## 2020-12-11 DIAGNOSIS — Z87.440: ICD-10-CM

## 2020-12-11 DIAGNOSIS — Z79.899: ICD-10-CM

## 2020-12-11 DIAGNOSIS — Z87.891: ICD-10-CM

## 2020-12-11 DIAGNOSIS — E07.9: ICD-10-CM

## 2020-12-11 DIAGNOSIS — Z88.1: ICD-10-CM

## 2020-12-11 DIAGNOSIS — K21.9: ICD-10-CM

## 2020-12-11 DIAGNOSIS — Z88.5: ICD-10-CM

## 2020-12-11 DIAGNOSIS — R53.1: ICD-10-CM

## 2020-12-11 DIAGNOSIS — R41.0: Primary | ICD-10-CM

## 2020-12-11 NOTE — ED.PDOC
History of Present Illness





- General


Chief Complaint: Neuro Symptoms/Deficits


Stated Complaint: AMS, difficulty exhaling


Time Seen by Provider: 20 16:33


Source: patient, RN notes reviewed, Vital Signs reviewed, EMS notes reviewed


Exam Limitations: clinical condition





- History of Present Illness


Initial Comments: 





History of present illness and review of systems is limited secondary to patient

confusion and altered mental status.  Per EMS, patient was sent by nursing home 

secondary to altered mental status x6 hours.  Patient had just finished 

treatment for UTI yesterday.  He had been on antibiotics, oral.  On arrival here

patient is confused, GCS of 9.  Crabtree catheter is in place and it has a 

discolored brown slurry in the catheter.


Timing/Duration: 4-6 hours


Severity: severe


Improving Factors: nothing


Worsening Factors: nothing


Associated Symptoms: denies symptoms


Allergies/Adverse Reactions: 


Allergies





Amoxicillin Allergy (Verified 20 16:03)


   


Codeine Allergy (Verified 20 16:03)


   





Home Medications: 


Ambulatory Orders





Albuterol Sulfate Nebs [Proventil Nebs] 2.5 mg NEB Q6HR PRN 20 


Albuterol Sulfate [Ventolin Hfa] 108 mcg INH Q6HR PRN 20 


Ascorbic Acid [Vitamin C] 1,000 mg PO DAILY 20 


Atorvastatin Calcium [Lipitor] 10 mg PO BEDTIME 20 


Carbidopa-Levodopa [Carbidopa/Levodopa  mg] 2 tab PO 5XD 20 


Cyanocobalamin [Vitamin B12] 1,000 mcg PO DAILY 20 


Escitalopram Oxalate 15 mg PO BEDTIME 20 


Fluticasone Propionate (Nasal) [Fluticasone Propionate] 50 mcg BNAS BID 20




Gabapentin 600 mg PO TID 20 


Guaifenesin 400 mg PO BID 20 


Hydroxyzine HCl [Hydroxyzine Hydrochloride] 25 mg PO TID 20 


Levothyroxine Sodium [Synthroid] 100 mcg PO 0600 20 


Lisinopril 20 mg PO DAILY 20 


Meloxicam 15 mg PO DAILY 20 


Multiple Vitamin 1 tab PO DAILY 20 


Polyethylene Glycol 3350 1 pow PO DAILY 20 


Tramadol HCl 50 mg PO Q6H PRN 20 


Trazodone HCl [Trazodone Hydrochloride] 25 mg PO BEDTIME 20 


Acetaminophen [Tylenol] 650 mg PO Q6H PRN 20 


Albuterol Inhaler [Ventolin Hfa Inhaler] 2 inh IN PRN 20 


Ciclopirox 1 % EX PRN PRN 20 


Ibuprofen [Ibu] 800 mg PO Q8H PRN 20 


Magnesium Hydroxide [Milk Of Magnesia] 30 ml PO Q12H PRN 20 


Polyvinyl Alcohol [Artificial Tears] 1 drop BOTH_EYES Q6H PRN 20 


Triamcinolone 0.1% Oint [Kenalog 0.1% Ointment] 1 applic TOP PRN 20 


Cyclobenzaprine HCl [Cyclobenzaprine Hydrochlo] 5 mg PO Q8H PRN 20 


Lidocaine 4% Patch 1 ea TOP DAILY 20 


Albuterol Inhaler [Ventolin Hfa Inhaler] 2 puff INH QID #1 inh 20 


Apixaban [Eliquis] 2.5 mg PO BID 30 Days  tab 20 


Azithromycin Tab [Zithromax Tab] 250 mg PO QD #4 tab 20 


Bifidobacterium Infantis [Align] 4 mg PO BID #30 capsule 20 


Cefdinir [Omnicef] 300 mg PO BID #22 cap 20 


Dexamethasone Tab [Decadron Tab] 4 mg PO DAILY #7 tab 20 


Guaifenesin [Mucinex] 600 mg PO BID #60 tab 20 











Review of Systems





- Review of Systems


Constitutional: States: see HPI


Unable to Obtain Due To: condition, clinical condition





Past Medical History (General)





- Patient Medical History


Hx Seizures: No


Hx Stroke: No


Hx Asthma: No


Hx of COPD: No


Hx Cardiac Disorders: Yes - Angina


Hx Congestive Heart Failure: No


Hx Pacemaker: No


Hx Hypertension: Yes


Hx Thyroid Disease: Yes


Hx Diabetes: No


Hx Gastroesophageal Reflux: Yes


Hx Cancer: No


Hx Hepatitis C: No


Hx MRSA: No





- Vaccination History


Hx Tetanus, Diphtheria Vaccination:  - unknown


Hx Influenza Vaccination: No


Hx Pneumococcal Vaccination: No





- Social History


Hx Tobacco Use: Yes


Hx Alcohol Use: Yes


Hx Substance Use: No


Hx Substance Use Treatment: No


Hx Depression: No


Hx Physical Abuse: No


Hx Emotional Abuse: No





- Activities of Daily Living


Nursing Home/Assisted Living (if applicable):: [facility]





- Female History


Patient is a Female of Child Bearing Age (10 -59 yrs old): No


Patient Pregnant: No





Family Medical History





- Family History


  ** Mother


Family History: Unknown


Living Status: 





  ** Father


Living Status: 





Physical Exam





- Physical Exam


General Appearance: Obvious distress, Ill Appearing, Unkempt, Well Developed, 

Well Nourished


Eye Exam: bilateral normal


Ears, Nose, Throat: other - Pt with dry MM.  No tongue lacerations.  No 

tonsillar swelling. 


Neck: full range of motion, supple


Respiratory: decreased breath sounds, rhonchi - diffusely throughout


Cardiovascular/Chest: regular rate, rhythm, no edema, no gallop, no murmur


Peripheral Pulses: radial,right: 2+, radial,left: 2+


Gastrointestinal/Abdominal: normal bowel sounds, soft, tenderness - 

suprapubically


Back Exam: no CVA tenderness, no vertebral tenderness


Extremity: non-tender, normal capillary refill - pt with contractures in 

feet/hands., other


Neurologic: aphasia, motor weakness, disoriented x 3


Skin Exam: warm/dry, pallor


Lymphatic: no adenopathy





Progress





- Progress


Progress: 


Differential diagnosis: CVA, TIA, dehydration, acute MI among others.








20 17:45


Patient sent from nursing home secondary to weakness and drooling.  Concern for 

possible left facial droop.  Patient is talking to his son and is alert and 

oriented.  Lab work and CT are unremarkable as is chest x-ray and EKG.  Plan on 

discharge back to the nursing home.  I discussed this plan of care with the 

patient he voices understanding and agreement.





Abdiel Koch M.D.


#[phone]

















- Results/Orders


Results/Orders: 





EKG performed 2020 at 1553 hrs.: Normal sinus rhythm at 60 bpm, 

normal axis deviation, low voltage QRS, no ST or T wave changes concerning for 

ischemia, borderline EKG.  No comparison EKG available at this time.





EXAM: CT Head Without Intravenous Contrast  CLINICAL HISTORY: left facial droop,

 dysphagia  TECHNIQUE: Axial computed tomography images of the head/brain 

without intravenous contrast. Sagittal and coronal reformatted images were 

created and reviewed. This CT exam was performed using one or more of the follow

ing dose reduction techniques: automated exposure control, adjustment of the mA 

and/or kV according to patient size, and/or use of iterative reconstruction 

technique.  COMPARISON: No relevant prior studies available.  FINDINGS: 

Limitations: None. Brain: There is age related cortical atrophy and 

periventricular white matter hypodensity most consistent with chronic small 

ischemic change. No acute infarct, hemorrhage or mass. Midline shift: None. 

Ventricles: No abnormality noted. Bones/joints: No acute fracture or osseous 

destruction. Soft tissues: Visualized portions appear normal. Vasculature: No 

acute abnormality noted. Sinuses: No layering fluid in the visualized portions 

of the paranasal sinuses. Mastoid air cells: No mastoid effusion. Orbits: 

Visualized portions appear normal.  IMPRESSION: No acute findings in the 

head/brain.  Electronically signed by: Renae Munguia MD 2020 5:24 PM





EXAM: XR Chest, 1 View  CLINICAL HISTORY: confusion and left facial droop  

TECHNIQUE: Frontal view of the chest.  COMPARISON: 2020  FINDINGS: Lungs: 

No consolidation. Symmetrical vascular pattern. Pleural space: No pneumothorax 

or pleural effusion. Heart: Normal cardiac size and configuration. Mediastinum: 

No abnormality noted. Bones/joints: No osseous destruction or sclerosis noted.  

IMPRESSION: No abnormality noted.  Electronically signed by: Renae Munguia MD 

2020 5:20 PM





                                        





20 16:33


Sodium Chloride 0.9% (Flush) [Saline Flush Syringe]   10 ml IV PRN PRN 





20 16:34


Pulse Oximetry Assessment DAILY 





20 16:45


CARDIAC ENZYME GROUP Stat 


COMPLETE METABOLIC PROFILE Stat 


EKG STAT 





20 09:00


Pulse Ox Daily 








                         Laboratory Results - last 24 hr











  20





  16:45 16:45 16:45


 


WBC   5.9 


 


RBC   5.02 


 


Hgb   14.2 


 


Hct   41.8 L 


 


MCV   83.2 


 


MCH   28.4 


 


MCHC   34.1 


 


RDW   15.1 H 


 


Plt Count   322 


 


MPV   6.8 L 


 


Absolute Neuts (auto)   3.00 


 


Absolute Lymphs (auto)   1.80 


 


Absolute Monos (auto)   0.70 


 


Absolute Eos (auto)   0.30 


 


Absolute Basos (auto)   0.10 


 


Neutrophils %   51.1 


 


Lymphocytes %   30.5 


 


Monocytes %   11.6 H 


 


Eosinophils %   5.3 H 


 


Basophils %   1.5 


 


PT    11.0 H


 


INR    1.11


 


PTT (SP)    26.2


 


Sodium  138  


 


Potassium  4.4  


 


Chloride  103  


 


Carbon Dioxide  26  


 


Anion Gap  13.4  


 


BUN  18  


 


Creatinine  0.75  


 


BUN/Creatinine Ratio  24.0 H  


 


POC Glucose   


 


Random Glucose  96  


 


Serum Osmolality  277.4  


 


Calcium  9.1  


 


Total Bilirubin  0.7  


 


AST  20  


 


ALT  15  


 


Alkaline Phosphatase  70  


 


Creatine Kinase  100  


 


Troponin I  < 0.02  


 


Serum Total Protein  7.9  


 


Albumin  4.2  


 


Globulin  3.7 H  


 


Albumin/Globulin Ratio  1.1  














  20





  16:45


 


WBC 


 


RBC 


 


Hgb 


 


Hct 


 


MCV 


 


MCH 


 


MCHC 


 


RDW 


 


Plt Count 


 


MPV 


 


Absolute Neuts (auto) 


 


Absolute Lymphs (auto) 


 


Absolute Monos (auto) 


 


Absolute Eos (auto) 


 


Absolute Basos (auto) 


 


Neutrophils % 


 


Lymphocytes % 


 


Monocytes % 


 


Eosinophils % 


 


Basophils % 


 


PT 


 


INR 


 


PTT (SP) 


 


Sodium 


 


Potassium 


 


Chloride 


 


Carbon Dioxide 


 


Anion Gap 


 


BUN 


 


Creatinine 


 


BUN/Creatinine Ratio 


 


POC Glucose  94


 


Random Glucose 


 


Serum Osmolality 


 


Calcium 


 


Total Bilirubin 


 


AST 


 


ALT 


 


Alkaline Phosphatase 


 


Creatine Kinase 


 


Troponin I 


 


Serum Total Protein 


 


Albumin 


 


Globulin 


 


Albumin/Globulin Ratio 














Departure





- Departure


Clinical Impression: 


 Confusion, Weakness





Disposition: Discharge to Asst Living


Condition: Fair


Departure Forms:  ED Discharge - Pt. Copy, Patient Portal Self Enrollment


Instructions:  Generalized Weakness (DC)


Diet: resume usual diet


Activity: increase activity as tolerated


Referrals: 


RAYSHAWN LEDESMA [Primary Care Provider] - 1-5 Days


Home Medications: 


Ambulatory Orders





Albuterol Sulfate Nebs [Proventil Nebs] 2.5 mg NEB Q6HR PRN 20 


Albuterol Sulfate [Ventolin Hfa] 108 mcg INH Q6HR PRN 20 


Ascorbic Acid [Vitamin C] 1,000 mg PO DAILY 20 


Atorvastatin Calcium [Lipitor] 10 mg PO BEDTIME 20 


Carbidopa-Levodopa [Carbidopa/Levodopa  mg] 2 tab PO 5XD 20 


Cyanocobalamin [Vitamin B12] 1,000 mcg PO DAILY 20 


Escitalopram Oxalate 15 mg PO BEDTIME 20 


Fluticasone Propionate (Nasal) [Fluticasone Propionate] 50 mcg BNAS BID 20




Gabapentin 600 mg PO TID 20 


Guaifenesin 400 mg PO BID 20 


Hydroxyzine HCl [Hydroxyzine Hydrochloride] 25 mg PO TID 20 


Levothyroxine Sodium [Synthroid] 100 mcg PO 0600 20 


Lisinopril 20 mg PO DAILY 20 


Meloxicam 15 mg PO DAILY 20 


Multiple Vitamin 1 tab PO DAILY 20 


Polyethylene Glycol 3350 1 pow PO DAILY 20 


Tramadol HCl 50 mg PO Q6H PRN 20 


Trazodone HCl [Trazodone Hydrochloride] 25 mg PO BEDTIME 20 


Acetaminophen [Tylenol] 650 mg PO Q6H PRN 20 


Albuterol Inhaler [Ventolin Hfa Inhaler] 2 inh IN PRN 20 


Ciclopirox 1 % EX PRN PRN 20 


Ibuprofen [Ibu] 800 mg PO Q8H PRN 20 


Magnesium Hydroxide [Milk Of Magnesia] 30 ml PO Q12H PRN 20 


Polyvinyl Alcohol [Artificial Tears] 1 drop BOTH_EYES Q6H PRN 20 


Triamcinolone 0.1% Oint [Kenalog 0.1% Ointment] 1 applic TOP PRN 20 


Cyclobenzaprine HCl [Cyclobenzaprine Hydrochlo] 5 mg PO Q8H PRN 20 


Lidocaine 4% Patch 1 ea TOP DAILY 20 


Albuterol Inhaler [Ventolin Hfa Inhaler] 2 puff INH QID #1 inh 20 


Apixaban [Eliquis] 2.5 mg PO BID 30 Days  tab 20 


Azithromycin Tab [Zithromax Tab] 250 mg PO QD #4 tab 20 


Bifidobacterium Infantis [Align] 4 mg PO BID #30 capsule 20 


Cefdinir [Omnicef] 300 mg PO BID #22 cap 20 


Dexamethasone Tab [Decadron Tab] 4 mg PO DAILY #7 tab 20 


Guaifenesin [Mucinex] 600 mg PO BID #60 tab 20

## 2020-12-11 NOTE — RAD
EXAM:  XR Chest, 1 View



CLINICAL HISTORY:  confusion and left facial droop



TECHNIQUE:  Frontal view of the chest.



COMPARISON:  11/29/2020



FINDINGS:

  Lungs:  No consolidation.  Symmetrical vascular pattern.

  Pleural space:  No pneumothorax or pleural effusion.

  Heart:  Normal cardiac size and configuration.

  Mediastinum:  No abnormality noted.

  Bones/joints:  No osseous destruction or sclerosis noted.



IMPRESSION:     

  No abnormality noted.



Electronically signed by:  Renae Munguia MD  12/11/2020 5:20 PM

[facility] Workstation: [id number]

## 2020-12-11 NOTE — CT
EXAM:  CT Head Without Intravenous Contrast



CLINICAL HISTORY:  left facial droop, dysphagia



TECHNIQUE:  Axial computed tomography images of the head/brain

without intravenous contrast.  Sagittal and coronal reformatted

images were created and reviewed.  This CT exam was performed

using one or more of the following dose reduction techniques: 

automated exposure control, adjustment of the mA and/or kV

according to patient size, and/or use of iterative reconstruction

technique.



COMPARISON:  No relevant prior studies available.



FINDINGS:

  Limitations:  None.

  Brain:  There is age related cortical atrophy and

periventricular white matter hypodensity most consistent with

chronic small ischemic change.  No acute infarct, hemorrhage or

mass.

  Midline shift:  None.

  Ventricles:  No abnormality noted.

  Bones/joints:  No acute fracture or osseous destruction.

  Soft tissues:  Visualized portions appear normal.

  Vasculature:  No acute abnormality noted.

  Sinuses:  No layering fluid in the visualized portions of the

paranasal sinuses.

  Mastoid air cells:  No mastoid effusion.

  Orbits:  Visualized portions appear normal.



IMPRESSION:     

  No acute findings in the head/brain.



Electronically signed by:  Renae Munguia MD  12/11/2020 5:24 PM

[facility] Workstation: [id number]